# Patient Record
Sex: FEMALE | Race: OTHER | Employment: UNEMPLOYED | URBAN - METROPOLITAN AREA
[De-identification: names, ages, dates, MRNs, and addresses within clinical notes are randomized per-mention and may not be internally consistent; named-entity substitution may affect disease eponyms.]

---

## 2017-04-30 ENCOUNTER — APPOINTMENT (OUTPATIENT)
Dept: CT IMAGING | Age: 32
End: 2017-04-30
Attending: EMERGENCY MEDICINE
Payer: SELF-PAY

## 2017-04-30 ENCOUNTER — HOSPITAL ENCOUNTER (EMERGENCY)
Age: 32
Discharge: HOME | End: 2017-04-30
Attending: EMERGENCY MEDICINE | Admitting: EMERGENCY MEDICINE
Payer: SELF-PAY

## 2017-04-30 VITALS
DIASTOLIC BLOOD PRESSURE: 76 MMHG | TEMPERATURE: 98.6 F | RESPIRATION RATE: 18 BRPM | HEART RATE: 75 BPM | BODY MASS INDEX: 41.65 KG/M2 | OXYGEN SATURATION: 99 % | HEIGHT: 65 IN | SYSTOLIC BLOOD PRESSURE: 139 MMHG | WEIGHT: 250 LBS

## 2017-04-30 DIAGNOSIS — R10.31 ABDOMINAL PAIN, RIGHT LOWER QUADRANT: Primary | ICD-10-CM

## 2017-04-30 LAB
ALBUMIN SERPL BCP-MCNC: 3.6 G/DL (ref 3.5–5)
ALBUMIN/GLOB SERPL: 0.9 {RATIO} (ref 1.1–2.2)
ALP SERPL-CCNC: 76 U/L (ref 45–117)
ALT SERPL-CCNC: 48 U/L (ref 12–78)
ANION GAP BLD CALC-SCNC: 7 MMOL/L (ref 5–15)
APPEARANCE UR: CLEAR
AST SERPL W P-5'-P-CCNC: 23 U/L (ref 15–37)
BACTERIA URNS QL MICRO: NEGATIVE /HPF
BASOPHILS # BLD AUTO: 0 K/UL (ref 0–0.1)
BASOPHILS # BLD: 0 % (ref 0–1)
BILIRUB SERPL-MCNC: 0.3 MG/DL (ref 0.2–1)
BILIRUB UR QL: NEGATIVE
BUN SERPL-MCNC: 11 MG/DL (ref 6–20)
BUN/CREAT SERPL: 18 (ref 12–20)
CALCIUM SERPL-MCNC: 8.5 MG/DL (ref 8.5–10.1)
CHLORIDE SERPL-SCNC: 106 MMOL/L (ref 97–108)
CO2 SERPL-SCNC: 25 MMOL/L (ref 21–32)
COLOR UR: ABNORMAL
CREAT SERPL-MCNC: 0.61 MG/DL (ref 0.55–1.02)
EOSINOPHIL # BLD: 0.4 K/UL (ref 0–0.4)
EOSINOPHIL NFR BLD: 5 % (ref 0–7)
EPITH CASTS URNS QL MICRO: ABNORMAL /LPF
ERYTHROCYTE [DISTWIDTH] IN BLOOD BY AUTOMATED COUNT: 13.9 % (ref 11.5–14.5)
GLOBULIN SER CALC-MCNC: 3.9 G/DL (ref 2–4)
GLUCOSE SERPL-MCNC: 101 MG/DL (ref 65–100)
GLUCOSE UR STRIP.AUTO-MCNC: NEGATIVE MG/DL
HCG UR QL: NEGATIVE
HCT VFR BLD AUTO: 39.2 % (ref 35–47)
HGB BLD-MCNC: 12.5 G/DL (ref 11.5–16)
HGB UR QL STRIP: ABNORMAL
KETONES UR QL STRIP.AUTO: NEGATIVE MG/DL
LEUKOCYTE ESTERASE UR QL STRIP.AUTO: NEGATIVE
LYMPHOCYTES # BLD AUTO: 27 % (ref 12–49)
LYMPHOCYTES # BLD: 2.4 K/UL (ref 0.8–3.5)
MCH RBC QN AUTO: 25.9 PG (ref 26–34)
MCHC RBC AUTO-ENTMCNC: 31.9 G/DL (ref 30–36.5)
MCV RBC AUTO: 81.2 FL (ref 80–99)
MONOCYTES # BLD: 0.6 K/UL (ref 0–1)
MONOCYTES NFR BLD AUTO: 7 % (ref 5–13)
NEUTS SEG # BLD: 5.3 K/UL (ref 1.8–8)
NEUTS SEG NFR BLD AUTO: 61 % (ref 32–75)
NITRITE UR QL STRIP.AUTO: NEGATIVE
PH UR STRIP: 6 [PH] (ref 5–8)
PLATELET # BLD AUTO: 358 K/UL (ref 150–400)
POTASSIUM SERPL-SCNC: 3.7 MMOL/L (ref 3.5–5.1)
PROT SERPL-MCNC: 7.5 G/DL (ref 6.4–8.2)
PROT UR STRIP-MCNC: NEGATIVE MG/DL
RBC # BLD AUTO: 4.83 M/UL (ref 3.8–5.2)
RBC #/AREA URNS HPF: ABNORMAL /HPF (ref 0–5)
SODIUM SERPL-SCNC: 138 MMOL/L (ref 136–145)
SP GR UR REFRACTOMETRY: 1.01 (ref 1–1.03)
UROBILINOGEN UR QL STRIP.AUTO: 0.2 EU/DL (ref 0.2–1)
WBC # BLD AUTO: 8.7 K/UL (ref 3.6–11)
WBC URNS QL MICRO: ABNORMAL /HPF (ref 0–4)

## 2017-04-30 PROCEDURE — 36415 COLL VENOUS BLD VENIPUNCTURE: CPT | Performed by: EMERGENCY MEDICINE

## 2017-04-30 PROCEDURE — 80053 COMPREHEN METABOLIC PANEL: CPT | Performed by: EMERGENCY MEDICINE

## 2017-04-30 PROCEDURE — 74177 CT ABD & PELVIS W/CONTRAST: CPT

## 2017-04-30 PROCEDURE — 81025 URINE PREGNANCY TEST: CPT

## 2017-04-30 PROCEDURE — 96361 HYDRATE IV INFUSION ADD-ON: CPT

## 2017-04-30 PROCEDURE — 74011000258 HC RX REV CODE- 258: Performed by: EMERGENCY MEDICINE

## 2017-04-30 PROCEDURE — 81001 URINALYSIS AUTO W/SCOPE: CPT | Performed by: EMERGENCY MEDICINE

## 2017-04-30 PROCEDURE — 74011250636 HC RX REV CODE- 250/636: Performed by: EMERGENCY MEDICINE

## 2017-04-30 PROCEDURE — 85025 COMPLETE CBC W/AUTO DIFF WBC: CPT | Performed by: EMERGENCY MEDICINE

## 2017-04-30 PROCEDURE — 99284 EMERGENCY DEPT VISIT MOD MDM: CPT

## 2017-04-30 PROCEDURE — 96374 THER/PROPH/DIAG INJ IV PUSH: CPT

## 2017-04-30 PROCEDURE — 74011636320 HC RX REV CODE- 636/320: Performed by: EMERGENCY MEDICINE

## 2017-04-30 PROCEDURE — 96375 TX/PRO/DX INJ NEW DRUG ADDON: CPT

## 2017-04-30 RX ORDER — SODIUM CHLORIDE 0.9 % (FLUSH) 0.9 %
10 SYRINGE (ML) INJECTION
Status: COMPLETED | OUTPATIENT
Start: 2017-04-30 | End: 2017-04-30

## 2017-04-30 RX ORDER — ONDANSETRON 2 MG/ML
4 INJECTION INTRAMUSCULAR; INTRAVENOUS
Status: COMPLETED | OUTPATIENT
Start: 2017-04-30 | End: 2017-04-30

## 2017-04-30 RX ORDER — MORPHINE SULFATE 4 MG/ML
4 INJECTION, SOLUTION INTRAMUSCULAR; INTRAVENOUS ONCE
Status: COMPLETED | OUTPATIENT
Start: 2017-04-30 | End: 2017-04-30

## 2017-04-30 RX ORDER — HYDROCODONE BITARTRATE AND ACETAMINOPHEN 7.5; 325 MG/1; MG/1
1 TABLET ORAL
Qty: 20 TAB | Refills: 0 | Status: SHIPPED | OUTPATIENT
Start: 2017-04-30 | End: 2017-09-11

## 2017-04-30 RX ADMIN — SODIUM CHLORIDE 1000 ML: 900 INJECTION, SOLUTION INTRAVENOUS at 09:35

## 2017-04-30 RX ADMIN — Medication 10 ML: at 10:21

## 2017-04-30 RX ADMIN — SODIUM CHLORIDE 100 ML: 900 INJECTION, SOLUTION INTRAVENOUS at 10:21

## 2017-04-30 RX ADMIN — IOPAMIDOL 100 ML: 755 INJECTION, SOLUTION INTRAVENOUS at 10:21

## 2017-04-30 RX ADMIN — ONDANSETRON 4 MG: 2 INJECTION INTRAMUSCULAR; INTRAVENOUS at 09:34

## 2017-04-30 RX ADMIN — Medication 4 MG: at 09:34

## 2017-04-30 NOTE — ED NOTES
Pt resting comfortably with eyes closed @ this time. Respirations even and unlabored. Easily awakens to voice and light touch. No distress noted. Call light in reach. Will continue to monitor.

## 2017-04-30 NOTE — DISCHARGE INSTRUCTIONS
Dolor abdominal: Instrucciones de cuidado - [ Abdominal Pain: Care Instructions ]  Instrucciones de cuidado    El dolor abdominal tiene muchas causas posibles. Algunas de ellas no son graves y mejoran por sí solas en unos días. Otras requieren Linda Emmett y [place]. Si reyna dolor continúa o [place], necesitará konstantin nueva revisión y [place] pruebas para determinar qué pasa. Es posible que necesite cirugía para corregir el problema. No ignore nuevos síntomas, wilner fiebre, náuseas y [place], [facility], dolor que [place] o [place]. Podrían ser señales de un problema más grave. Reyna médico puede haberle recomendado konstantin consulta de [employer] las 8 o 12 horas siguientes. Si no se siente mejor, es posible que requiera Linda Emmett o [place]. El médico lo blackmon revisado minuciosamente, barry puede josse problemas más tarde. Si nota algún problema o síntomas nuevos, busque tratamiento médico inmediatamente. La atención de seguimiento es konstantin parte clave de reyna tratamiento y seguridad. Asegúrese de hacer y acudir a todas las citas, y llame a reyna médico si está teniendo problemas. También es konstantin buena idea saber los resultados de los exámenes y mantener konstantin lista de los medicamentos que lacho. ¿Cómo puede cuidarse en el hogar? · Descanse hasta que se sienta mejor. · Para prevenir la deshidratación, dena abundantes líquidos, suficientes para que reyna orina sea de color amarillo norma o transparente wilner el agua. Elija beber agua y otros líquidos mojgan sin cafeína hasta que se sienta mejor. Si tiene [employer], del corazón o del hígado y tiene que Angela's líquidos, hable con reyna médico antes de aumentar reyna consumo. · Si tiene [employer], coma alimentos suaves, wilner arroz, pan dave seco o galletas saladas, bananas (plátanos) y puré de [employer]. Trate de comer varias comidas pequeñas al día en lugar de dos o lea grandes.   · Espere hasta 50 horas después de que [employer] síntomas hayan desaparecido antes de comer alimentos condimentados, alcohol y bebidas que contengan cafeína. · No consuma alimentos ricos en grasa. · Evite medicamentos antiinflamatorios wilner aspirina, ibuprofeno (Advil, Motrin) y naproxeno (Aleve). Pueden causar [employer]. Dígale a richter médico si está tomando aspirina diariamente debido a otro problema de velvet. ¿Cuándo debe pedir ayuda? Llame al 911 en cualquier momento que considere que necesita atención de emergencia. Por ejemplo, llame si:  · Se desmayó (perdió el conocimiento). · Las heces son de color rojizo o muy sanguinolentas (con katherine). · Vomita katherine o algo parecido a granos de café molido. · Tiene dolor abdominal nuevo e intenso. Llame a richter médico ahora mismo o busque atención médica inmediata si:  · Richter dolor empeora, sobre todo si se concentra en konstantin berhane parte del vientre. · Vuelve a tener fiebre o tiene fiebre más gus. · Gala heces son negruzcas y parecidas al alquitrán o tienen rastros de [place]. · Tiene sangrado vaginal inesperado. · Tiene síntomas de konstantin infección del tracto urinario. Estos podrían incluir:  ¨ Dolor al [place]. ¨ Orinar con más frecuencia que lo habitual.  ¨ Katherine en la [place]. · Siente mareos o aturdimiento, o que está a punto de [place]. Preste especial atención a los cambios en richter velvet y asegúrese de comunicarse con richter médico si:  · No está mejorando después de 1 día (24 horas). ¿Dónde puede encontrar más información en inglés? Hiwot Hayes a http://césar-clovis.info/. Mathew Miller W347 en la búsqueda para aprender más acerca de \"Dolor abdominal: Instrucciones de cuidado - [ Abdominal Pain: Care Instructions ]. \"  Revisado: 27 sellers, 2016  Versión del contenido: 11.2  © 2799-2121 [employer]. Las instrucciones de cuidado fueron adaptadas bajo licencia por [employer] (which disclaims liability or warranty for this information).  Si usted tiene [place] afección médica o sobre estas instrucciones, siempre pregunte a reyna profesional de velvet. [employer] niega toda garantía o responsabilidad por reyna uso de esta información. We hope that we have addressed all of your medical concerns. The examination and treatment you received in the Emergency Department were for an emergent problem and were not intended as complete care. It is important that you follow up with your healthcare provider(s) for ongoing care. If your symptoms worsen or do not improve as expected, and you are unable to reach your usual health care provider(s), you should return to the Emergency Department. Today's healthcare is undergoing tremendous change, and patient satisfaction surveys are one of the many tools to assess the quality of medical care. You may receive a survey from the [employer] regarding your experience in the Emergency Department. I hope that your experience has been completely positive, particularly the medical care that I provided. As such, please participate in the survey; anything less than excellent does not meet my expectations or intentions. [facility] and [address] [place] [address] participate in nationally recognized quality of care measures. If your blood pressure is greater than 120/80, as reported below, we urge that you seek medical care to address the potential of high blood pressure, commonly known as hypertension. Hypertension can be hereditary or can be caused by certain medical conditions, pain, stress, or \"white coat syndrome. \"       Please make an appointment with your health care provider(s) for follow up of your Emergency Department visit.        VITALS:   Patient Vitals for the past 8 hrs:   Temp Pulse Resp BP SpO2   04/30/17 1000 - - - 141/64 98 %   04/30/17 0945 - - - 131/68 97 %   04/30/17 0930 - - - 128/81 97 %   04/30/17 0915 - - - 110/56 97 %   04/30/17 0900 - - - 127/67 99 % 04/30/17 0853 98.3 °F (36.8 °C) 76 18 (!) 142/92 98 %          Thank you for allowing us to provide you with medical care today. We realize that you have many choices for your emergency care needs. Please choose us in the future for any continued health care needs. Maria Luz Bright MD    [address] [facility].   Office: [phone]            Recent Results (from the past 24 hour(s))   CBC WITH AUTOMATED DIFF    Collection Time: 04/30/17  8:57 AM   Result Value Ref Range    WBC 8.7 3.6 - 11.0 K/uL    RBC 4.83 3.80 - 5.20 M/uL    HGB 12.5 11.5 - 16.0 g/dL    HCT 39.2 35.0 - 47.0 %    MCV 81.2 80.0 - 99.0 FL    MCH 25.9 (L) 26.0 - 34.0 PG    MCHC 31.9 30.0 - 36.5 g/dL    RDW 13.9 11.5 - 14.5 %    PLATELET 812 734 - 788 K/uL    NEUTROPHILS 61 32 - 75 %    LYMPHOCYTES 27 12 - 49 %    MONOCYTES 7 5 - 13 %    EOSINOPHILS 5 0 - 7 %    BASOPHILS 0 0 - 1 %    ABS. NEUTROPHILS 5.3 1.8 - 8.0 K/UL    ABS. LYMPHOCYTES 2.4 0.8 - 3.5 K/UL    ABS. MONOCYTES 0.6 0.0 - 1.0 K/UL    ABS. EOSINOPHILS 0.4 0.0 - 0.4 K/UL    ABS. BASOPHILS 0.0 0.0 - 0.1 K/UL   METABOLIC PANEL, COMPREHENSIVE    Collection Time: 04/30/17  8:57 AM   Result Value Ref Range    Sodium 138 136 - 145 mmol/L    Potassium 3.7 3.5 - 5.1 mmol/L    Chloride 106 97 - 108 mmol/L    CO2 25 21 - 32 mmol/L    Anion gap 7 5 - 15 mmol/L    Glucose 101 (H) 65 - 100 mg/dL    BUN 11 6 - 20 MG/DL    Creatinine 0.61 0.55 - 1.02 MG/DL    BUN/Creatinine ratio 18 12 - 20      GFR est AA >60 >60 ml/min/1.73m2    GFR est non-AA >60 >60 ml/min/1.73m2    Calcium 8.5 8.5 - 10.1 MG/DL    Bilirubin, total 0.3 0.2 - 1.0 MG/DL    ALT (SGPT) 48 12 - 78 U/L    AST (SGOT) 23 15 - 37 U/L    Alk.  phosphatase 76 45 - 117 U/L    Protein, total 7.5 6.4 - 8.2 g/dL    Albumin 3.6 3.5 - 5.0 g/dL    Globulin 3.9 2.0 - 4.0 g/dL    A-G Ratio 0.9 (L) 1.1 - 2.2     URINALYSIS W/MICROSCOPIC    Collection Time: 04/30/17  9:08 AM   Result Value Ref Range    Color YELLOW/STRAW      Appearance CLEAR CLEAR      Specific gravity 1.015 1.003 - 1.030      pH (UA) 6.0 5.0 - 8.0      Protein NEGATIVE  NEG mg/dL    Glucose NEGATIVE  NEG mg/dL    Ketone NEGATIVE  NEG mg/dL    Bilirubin NEGATIVE  NEG      Blood MODERATE (A) NEG      Urobilinogen 0.2 0.2 - 1.0 EU/dL    Nitrites NEGATIVE  NEG      Leukocyte Esterase NEGATIVE  NEG      WBC 0-4 0 - 4 /hpf    RBC 0-5 0 - 5 /hpf    Epithelial cells FEW FEW /lpf    Bacteria NEGATIVE  NEG /hpf   HCG URINE, QL. - POC    Collection Time: 04/30/17 10:01 AM   Result Value Ref Range    Pregnancy test,urine (POC) NEGATIVE  NEG         Ct Abd Pelv W Cont    Result Date: 4/30/2017  INDICATION: RLQ pain/torsed R fallopian tube resection /still has appendix and ovary I. Right lower quadrant abdominal pain that began at 0400 hours this morning, described as stabbing and constant. Currently on menstrual cycle. Reports similar abdominal pain with past menstrual cycles although pain today is worse than in past. COMPARISON: CT February 23, 2016 TECHNIQUE: Following the uneventful intravenous administration of 100 cc Isovue-370, thin axial images were obtained through the abdomen and pelvis. Coronal and sagittal reconstructions were generated. Oral contrast was not, per order of the ordering physician, administered. CT dose reduction was achieved through use of a standardized protocol tailored for this examination and automatic exposure control for dose modulation. Adaptive statistical iterative reconstruction (ASIR) was utilized. The lack of oral contrast material substantially decreases the capacity of CT to determine abnormalities affecting the bowel. FINDINGS: LUNG BASES: Clear. INCIDENTALLY IMAGED HEART AND MEDIASTINUM: Unremarkable. LIVER: Mild diffuse steatosis. No mass. GALLBLADDER: Status post cholecystectomy. No biliary ductal dilation. SPLEEN: No mass. PANCREAS: No mass or ductal dilatation. ADRENALS: Unremarkable.  KIDNEYS: No mass, calculus, or hydronephrosis. STOMACH: Nondistended. SMALL BOWEL: Nondistended. COLON: Nondistended. APPENDIX: Normal caliber. PERITONEUM: No ascites or pneumoperitoneum. RETROPERITONEUM: No lymphadenopathy or aortic aneurysm. REPRODUCTIVE ORGANS: Vaginal tampon present. No mass, cyst or collection demonstrated. Previously noted 7.6 cm pelvic cystic lesion no longer shown. URINARY BLADDER: No mass or calculus. BONES: Normal. ADDITIONAL COMMENTS: N/A     IMPRESSION: No acute findings.

## 2017-04-30 NOTE — ED NOTES
Pt and family @ bedside updated on care. All verbalized understanding. Pt resting comfortably. Pt reports pain is 3/10 @ this time. V/S stable, no distress noted. Will cont to assess and monitor closely.

## 2017-04-30 NOTE — ED TRIAGE NOTES
Pt reports right lower abd pain that began @ 0400 this morning that she describes as stabbing and constant. Pt denies N/V/D or dysuria.

## 2017-04-30 NOTE — ED PROVIDER NOTES
HPI Comments: 28 y.o. female with no significant past medical history who presents to the ED with chief complaint of abdominal pain. Patient reports RLQ abdominal pain with onset at ~0400 \"this morning. \" Patient reports an episode of vomiting with onset of her symptoms. Patient reports currently being on her menstrual cycle. Patient reports a history of similar abdominal pain with past menstrual cycles; reports her pain today is worse than her past symptoms. Patient denies vomiting with her past menstrual cycle pain. Patient reports having \"two\" children. Patient reports a history of an appendectomy, cholecystomy, and an oophorectomy. There are no other acute medical concerns at this time. PCP: None    Note written by Shireen Welch, as dictated by Miguel Angel Mccall MD 9:20 AM     The history is provided by the patient. The history is limited by a language barrier. A  was used. History reviewed. No pertinent past medical history. Past Surgical History:   Procedure Laterality Date    HX LAP CHOLECYSTECTOMY           History reviewed. No pertinent family history. Social History     Social History    Marital status: SINGLE     Spouse name: N/A    Number of children: N/A    Years of education: N/A     Occupational History    Not on file. Social History Main Topics    Smoking status: Never Smoker    Smokeless tobacco: Not on file    Alcohol use No    Drug use: No    Sexual activity: Not on file     Other Topics Concern    Not on file     Social History Narrative    ** Merged History Encounter **         ** Merged History Encounter **              ALLERGIES: Dilaudid [hydromorphone (bulk)]; Other medication; and Vancomycin    Review of Systems   Constitutional: Negative for appetite change, chills and fever. HENT: Negative for rhinorrhea, sore throat and trouble swallowing. Eyes: Negative for photophobia.    Respiratory: Negative for cough and shortness of breath. Cardiovascular: Negative for chest pain and palpitations. Gastrointestinal: Positive for abdominal pain and vomiting. Negative for nausea. Genitourinary: Negative for dysuria, frequency and hematuria. Musculoskeletal: Negative for arthralgias. Neurological: Negative for dizziness, syncope and weakness. Psychiatric/Behavioral: Negative for behavioral problems. The patient is not nervous/anxious. All other systems reviewed and are negative. Vitals:    04/30/17 0853 04/30/17 0900 04/30/17 0915   BP: (!) 142/92 127/67 110/56   Pulse: 76     Resp: 18     Temp: 98.3 °F (36.8 °C)     SpO2: 98% 99% 97%   Weight: 113.4 kg (250 lb)     Height: 5' 5\" (1.651 m)              Physical Exam   Constitutional: She appears well-developed and well-nourished. HENT:   Head: Normocephalic and atraumatic. Mouth/Throat: Oropharynx is clear and moist.   Eyes: EOM are normal. Pupils are equal, round, and reactive to light. Neck: Normal range of motion. Neck supple. Cardiovascular: Normal rate, regular rhythm, normal heart sounds and intact distal pulses. Exam reveals no gallop and no friction rub. No murmur heard. Pulmonary/Chest: Effort normal. No respiratory distress. She has no wheezes. She has no rales. Abdominal: Soft. There is tenderness. There is no rebound. Mild RLQ abdominal tenderness; multiple well-healed laparoscopy scars. Musculoskeletal: Normal range of motion. She exhibits no tenderness. Neurological: She is alert. No cranial nerve deficit. Motor; symmetric   Skin: No erythema. Psychiatric: She has a normal mood and affect. Her behavior is normal.   Nursing note and vitals reviewed. Note written by Shireen Melendrez, as dictated by Yusuf Portillo MD 9:24 AM      [facility]  ED Course       Procedures    Note: Patient feels like she's had an appendectomy. She had a torsed right fallopian tube February 2016 which was removed by GYN surgery.  The appendix was adherent to this mass. I do not believe the appendix was removed. Appendectomy is not mentioned in her problem list and is not mentioned after it was  from the torsed fallopian tube in the operative note so I assume it was not removed. Patient is having right lower quadrant pain tenderness and vomiting. This is her typical pain with menses but is more severe and associated with vomiting compared to usual. The plan is to do another CAT scan to try to up figure out if  she has appendicitis this time or perhaps a torsed ovary. I do not believe the right ovary was removed at the last surgery after reviewing the operative note.   Nan Boothe MD  9:33 AM

## 2017-09-11 ENCOUNTER — APPOINTMENT (OUTPATIENT)
Dept: CT IMAGING | Age: 32
End: 2017-09-11
Attending: PHYSICIAN ASSISTANT
Payer: SELF-PAY

## 2017-09-11 ENCOUNTER — HOSPITAL ENCOUNTER (EMERGENCY)
Age: 32
Discharge: HOME | End: 2017-09-12
Attending: EMERGENCY MEDICINE
Payer: SELF-PAY

## 2017-09-11 DIAGNOSIS — R19.7 DIARRHEA, UNSPECIFIED TYPE: ICD-10-CM

## 2017-09-11 DIAGNOSIS — R10.84 ABDOMINAL PAIN, GENERALIZED: ICD-10-CM

## 2017-09-11 DIAGNOSIS — R51.9 NONINTRACTABLE HEADACHE, UNSPECIFIED CHRONICITY PATTERN, UNSPECIFIED HEADACHE TYPE: Primary | ICD-10-CM

## 2017-09-11 LAB
ALBUMIN SERPL-MCNC: 3.2 G/DL (ref 3.5–5)
ALBUMIN/GLOB SERPL: 0.8 {RATIO} (ref 1.1–2.2)
ALP SERPL-CCNC: 72 U/L (ref 45–117)
ALT SERPL-CCNC: 133 U/L (ref 12–78)
ANION GAP SERPL CALC-SCNC: 11 MMOL/L (ref 5–15)
APPEARANCE UR: CLEAR
AST SERPL-CCNC: 79 U/L (ref 15–37)
BACTERIA URNS QL MICRO: NEGATIVE /HPF
BASOPHILS # BLD: 0 K/UL (ref 0–0.1)
BASOPHILS NFR BLD: 1 % (ref 0–1)
BILIRUB SERPL-MCNC: 0.2 MG/DL (ref 0.2–1)
BILIRUB UR QL: NEGATIVE
BUN SERPL-MCNC: 12 MG/DL (ref 6–20)
BUN/CREAT SERPL: 18 (ref 12–20)
CALCIUM SERPL-MCNC: 9.4 MG/DL (ref 8.5–10.1)
CAOX CRY URNS QL MICRO: ABNORMAL
CHLORIDE SERPL-SCNC: 105 MMOL/L (ref 97–108)
CO2 SERPL-SCNC: 24 MMOL/L (ref 21–32)
COLOR UR: ABNORMAL
CREAT SERPL-MCNC: 0.66 MG/DL (ref 0.55–1.02)
EOSINOPHIL # BLD: 0.5 K/UL (ref 0–0.4)
EOSINOPHIL NFR BLD: 7 % (ref 0–7)
EPITH CASTS URNS QL MICRO: ABNORMAL /LPF
ERYTHROCYTE [DISTWIDTH] IN BLOOD BY AUTOMATED COUNT: 13.3 % (ref 11.5–14.5)
GLOBULIN SER CALC-MCNC: 4.1 G/DL (ref 2–4)
GLUCOSE SERPL-MCNC: 106 MG/DL (ref 65–100)
GLUCOSE UR STRIP.AUTO-MCNC: NEGATIVE MG/DL
HCG UR QL: NEGATIVE
HCT VFR BLD AUTO: 38.3 % (ref 35–47)
HGB BLD-MCNC: 12.4 G/DL (ref 11.5–16)
HGB UR QL STRIP: NEGATIVE
KETONES UR QL STRIP.AUTO: NEGATIVE MG/DL
LEUKOCYTE ESTERASE UR QL STRIP.AUTO: NEGATIVE
LIPASE SERPL-CCNC: 236 U/L (ref 73–393)
LYMPHOCYTES # BLD: 2.7 K/UL (ref 0.8–3.5)
LYMPHOCYTES NFR BLD: 36 % (ref 12–49)
MCH RBC QN AUTO: 26.9 PG (ref 26–34)
MCHC RBC AUTO-ENTMCNC: 32.4 G/DL (ref 30–36.5)
MCV RBC AUTO: 83.1 FL (ref 80–99)
MONOCYTES # BLD: 0.7 K/UL (ref 0–1)
MONOCYTES NFR BLD: 9 % (ref 5–13)
NEUTS SEG # BLD: 3.6 K/UL (ref 1.8–8)
NEUTS SEG NFR BLD: 47 % (ref 32–75)
NITRITE UR QL STRIP.AUTO: NEGATIVE
PH UR STRIP: 6 [PH] (ref 5–8)
PLATELET # BLD AUTO: 336 K/UL (ref 150–400)
POTASSIUM SERPL-SCNC: 3.6 MMOL/L (ref 3.5–5.1)
PROT SERPL-MCNC: 7.3 G/DL (ref 6.4–8.2)
PROT UR STRIP-MCNC: NEGATIVE MG/DL
RBC # BLD AUTO: 4.61 M/UL (ref 3.8–5.2)
RBC #/AREA URNS HPF: ABNORMAL /HPF (ref 0–5)
SODIUM SERPL-SCNC: 140 MMOL/L (ref 136–145)
SP GR UR REFRACTOMETRY: 1.03 (ref 1–1.03)
UR CULT HOLD, URHOLD: NORMAL
UROBILINOGEN UR QL STRIP.AUTO: 0.2 EU/DL (ref 0.2–1)
WBC # BLD AUTO: 7.5 K/UL (ref 3.6–11)
WBC URNS QL MICRO: ABNORMAL /HPF (ref 0–4)

## 2017-09-11 PROCEDURE — 36415 COLL VENOUS BLD VENIPUNCTURE: CPT | Performed by: EMERGENCY MEDICINE

## 2017-09-11 PROCEDURE — 83690 ASSAY OF LIPASE: CPT | Performed by: EMERGENCY MEDICINE

## 2017-09-11 PROCEDURE — 96374 THER/PROPH/DIAG INJ IV PUSH: CPT

## 2017-09-11 PROCEDURE — 96361 HYDRATE IV INFUSION ADD-ON: CPT

## 2017-09-11 PROCEDURE — 81001 URINALYSIS AUTO W/SCOPE: CPT | Performed by: PHYSICIAN ASSISTANT

## 2017-09-11 PROCEDURE — 74011000258 HC RX REV CODE- 258: Performed by: EMERGENCY MEDICINE

## 2017-09-11 PROCEDURE — 74011636320 HC RX REV CODE- 636/320: Performed by: EMERGENCY MEDICINE

## 2017-09-11 PROCEDURE — 85025 COMPLETE CBC W/AUTO DIFF WBC: CPT | Performed by: EMERGENCY MEDICINE

## 2017-09-11 PROCEDURE — 74011250636 HC RX REV CODE- 250/636: Performed by: PHYSICIAN ASSISTANT

## 2017-09-11 PROCEDURE — 81025 URINE PREGNANCY TEST: CPT

## 2017-09-11 PROCEDURE — 80053 COMPREHEN METABOLIC PANEL: CPT | Performed by: EMERGENCY MEDICINE

## 2017-09-11 PROCEDURE — 96375 TX/PRO/DX INJ NEW DRUG ADDON: CPT

## 2017-09-11 PROCEDURE — 74177 CT ABD & PELVIS W/CONTRAST: CPT

## 2017-09-11 PROCEDURE — 99283 EMERGENCY DEPT VISIT LOW MDM: CPT

## 2017-09-11 RX ORDER — SODIUM CHLORIDE 0.9 % (FLUSH) 0.9 %
10 SYRINGE (ML) INJECTION
Status: COMPLETED | OUTPATIENT
Start: 2017-09-11 | End: 2017-09-11

## 2017-09-11 RX ORDER — KETOROLAC TROMETHAMINE 30 MG/ML
30 INJECTION, SOLUTION INTRAMUSCULAR; INTRAVENOUS
Status: COMPLETED | OUTPATIENT
Start: 2017-09-11 | End: 2017-09-11

## 2017-09-11 RX ORDER — ONDANSETRON 2 MG/ML
4 INJECTION INTRAMUSCULAR; INTRAVENOUS
Status: COMPLETED | OUTPATIENT
Start: 2017-09-11 | End: 2017-09-11

## 2017-09-11 RX ADMIN — SODIUM CHLORIDE 1000 ML: 900 INJECTION, SOLUTION INTRAVENOUS at 22:47

## 2017-09-11 RX ADMIN — ONDANSETRON 4 MG: 2 INJECTION INTRAMUSCULAR; INTRAVENOUS at 23:11

## 2017-09-11 RX ADMIN — SODIUM CHLORIDE 100 ML: 900 INJECTION, SOLUTION INTRAVENOUS at 23:45

## 2017-09-11 RX ADMIN — Medication 10 ML: at 23:45

## 2017-09-11 RX ADMIN — IOPAMIDOL 100 ML: 755 INJECTION, SOLUTION INTRAVENOUS at 23:45

## 2017-09-11 RX ADMIN — KETOROLAC TROMETHAMINE 30 MG: 30 INJECTION, SOLUTION INTRAMUSCULAR at 23:11

## 2017-09-12 VITALS
DIASTOLIC BLOOD PRESSURE: 75 MMHG | WEIGHT: 268.13 LBS | OXYGEN SATURATION: 100 % | BODY MASS INDEX: 44.67 KG/M2 | HEART RATE: 78 BPM | RESPIRATION RATE: 20 BRPM | TEMPERATURE: 98.2 F | HEIGHT: 65 IN | SYSTOLIC BLOOD PRESSURE: 123 MMHG

## 2017-09-12 PROCEDURE — 74011250637 HC RX REV CODE- 250/637: Performed by: PHYSICIAN ASSISTANT

## 2017-09-12 RX ORDER — DICYCLOMINE HYDROCHLORIDE 20 MG/1
20 TABLET ORAL EVERY 6 HOURS
Qty: 20 TAB | Refills: 0 | Status: SHIPPED | OUTPATIENT
Start: 2017-09-12 | End: 2017-09-17

## 2017-09-12 RX ORDER — DICYCLOMINE HYDROCHLORIDE 20 MG/1
20 TABLET ORAL
Status: COMPLETED | OUTPATIENT
Start: 2017-09-12 | End: 2017-09-12

## 2017-09-12 RX ORDER — OMEPRAZOLE 20 MG/1
20 CAPSULE, DELAYED RELEASE ORAL DAILY
Qty: 20 CAP | Refills: 0 | Status: SHIPPED | OUTPATIENT
Start: 2017-09-12

## 2017-09-12 RX ORDER — DICYCLOMINE HYDROCHLORIDE 20 MG/1
20 TABLET ORAL EVERY 6 HOURS
Qty: 20 TAB | Refills: 0 | Status: SHIPPED | OUTPATIENT
Start: 2017-09-12 | End: 2017-09-12

## 2017-09-12 RX ORDER — ONDANSETRON 4 MG/1
4 TABLET, ORALLY DISINTEGRATING ORAL
Qty: 12 TAB | Refills: 0 | Status: SHIPPED | OUTPATIENT
Start: 2017-09-12 | End: 2017-09-22

## 2017-09-12 RX ORDER — KETOROLAC TROMETHAMINE 30 MG/ML
30 INJECTION, SOLUTION INTRAMUSCULAR; INTRAVENOUS
Status: DISCONTINUED | OUTPATIENT
Start: 2017-09-12 | End: 2017-09-12

## 2017-09-12 RX ADMIN — DICYCLOMINE HYDROCHLORIDE 20 MG: 20 TABLET ORAL at 00:43

## 2017-09-12 NOTE — ED TRIAGE NOTES
Wed. I started with diarrhea and having pain in the middle of my stomach. Tonight at 1700 I started with a H/A. The H/A is why I came in tonight. No vomiting.

## 2017-09-12 NOTE — DISCHARGE INSTRUCTIONS
Dolor abdominal: Instrucciones de cuidado - [ Abdominal Pain: Care Instructions ]  Instrucciones de cuidado    El dolor abdominal tiene muchas causas posibles. Algunas de ellas no son graves y mejoran por sí solas en unos días. Otras requieren [place] y [place]. Si reyna dolor continúa o [place], necesitará konstantin nueva revisión y [place] pruebas para determinar qué pasa. Es posible que necesite cirugía para corregir el problema. No ignore nuevos síntomas, wilner fiebre, náuseas y [place], [facility], dolor que [place] o [place]. Podrían ser señales de un problema más grave. Reyna médico puede haberle recomendado konstantin consulta de [employer] las 8 o 12 horas siguientes. Si no se siente mejor, es posible que requiera [place] o [place]. El médico lo blackmon revisado minuciosamente, barry puede josse problemas más tarde. Si nota algún problema o síntomas nuevos, busque tratamiento médico inmediatamente. La atención de seguimiento es konstantin parte clave de reyna tratamiento y seguridad. Asegúrese de hacer y acudir a todas las citas, y llame a reyna médico si está teniendo problemas. También es konstantin buena idea saber los resultados de los exámenes y mantener konstantin lista de los medicamentos que lacho. ¿Cómo puede cuidarse en el hogar? · Descanse hasta que se sienta mejor. · Para prevenir la deshidratación, dena abundantes líquidos, suficientes para que renya orina sea de color amarillo norma o transparente wilner el agua. Elija beber agua y otros líquidos mojgan sin cafeína hasta que se sienta mejor. Si tiene [employer], del corazón o del hígado y tiene que Angela's líquidos, hable con reyna médico antes de aumentar reyna consumo. · Si tiene [employer], coma alimentos suaves, wilner arroz, pan dave seco o galletas saladas, bananas (plátanos) y puré de [employer]. Trate de comer varias comidas pequeñas al día en lugar de dos o lea grandes.   · Espere hasta 50 horas después de que [employer] síntomas hayan desaparecido antes de comer alimentos condimentados, alcohol y bebidas que contengan cafeína. · No consuma alimentos ricos en grasa. · Evite medicamentos antiinflamatorios wilner aspirina, ibuprofeno (Advil, Motrin) y naproxeno (Aleve). Pueden causar [employer]. Dígale a reyna médico si está tomando aspirina diariamente debido a otro problema de velvet. ¿Cuándo debe pedir ayuda? Llame al 911 en cualquier momento que considere que necesita atención de emergencia. Por ejemplo, llame si:  · Se desmayó (perdió el conocimiento). · Las heces son de color rojizo o muy sanguinolentas (con katherine). · Vomita katherine o algo parecido a granos de café molido. · Tiene dolor abdominal nuevo e intenso. Llame a reyna médico ahora mismo o busque atención médica inmediata si:  · Reyna dolor empeora, sobre todo si se concentra en konstantin berhane parte del vientre. · Vuelve a tener fiebre o tiene fiebre más gus. · Gala heces son negruzcas y parecidas al alquitrán o tienen rastros de [place]. · Tiene sangrado vaginal inesperado. · Tiene síntomas de konstantin infección del tracto urinario. Estos podrían incluir:  ¨ Dolor al [place]. ¨ Orinar con más frecuencia que lo habitual.  ¨ [place] en la [place]. · Siente mareos o aturdimiento, o que está a punto de [place]. Preste especial atención a los cambios en reyna velvet y asegúrese de comunicarse con reyna médico si:  · No está mejorando después de 1 día (24 horas). ¿Dónde puede encontrar más información en inglés? [place] a http://césar-clovis.info/. [employer] F732 en la búsqueda para aprender más acerca de \"Dolor abdominal: Instrucciones de cuidado - [ Abdominal Pain: Care Instructions ]. \"  Revisado: 20 marzo, 2017  Versión del contenido: 11.3  © 6878-6500 [employer]. Las instrucciones de cuidado fueron adaptadas bajo licencia por [employer] (which disclaims liability or warranty for this information).  Si usted tiene [place] afección médica o sobre estas instrucciones, siempre pregunte a reyna profesional de velvet. [employer] niega toda garantía o responsabilidad por reyna uso de esta información. We hope that we have addressed all of your medical concerns. The examination and treatment you received in the Emergency Department were for an emergent problem and were not intended as complete care. It is important that you follow up with your healthcare provider(s) for ongoing care. If your symptoms worsen or do not improve as expected, and you are unable to reach your usual health care provider(s), you should return to the Emergency Department. Today's healthcare is undergoing tremendous change, and patient satisfaction surveys are one of the many tools to assess the quality of medical care. You may receive a survey from the [facility] regarding your experience in the Emergency Department. I hope that your experience has been completely positive, particularly the medical care that I provided. As such, please participate in the survey; anything less than excellent does not meet my expectations or intentions. [facility] and [address] [place] [address] participate in nationally recognized quality of care measures. If your blood pressure is greater than 120/80, as reported below, we urge that you seek medical care to address the potential of high blood pressure, commonly known as hypertension. Hypertension can be hereditary or can be caused by certain medical conditions, pain, stress, or \"white coat syndrome. \"       Please make an appointment with your health care provider(s) for follow up of your Emergency Department visit. VITALS:   Patient Vitals for the past 8 hrs:   Temp Pulse Resp BP SpO2   09/11/17 2215 98.4 °F (36.9 °C) 72 16 138/85 98 %          Thank you for allowing us to provide you with medical care today.   We realize that you have many choices for your emergency care needs. Please choose us in the future for any continued health care needs. Kapil Burkett, [facility].   Office: [phone]            Recent Results (from the past 24 hour(s))   CBC WITH AUTOMATED DIFF    Collection Time: 09/11/17 10:45 PM   Result Value Ref Range    WBC 7.5 3.6 - 11.0 K/uL    RBC 4.61 3.80 - 5.20 M/uL    HGB 12.4 11.5 - 16.0 g/dL    HCT 38.3 35.0 - 47.0 %    MCV 83.1 80.0 - 99.0 FL    MCH 26.9 26.0 - 34.0 PG    MCHC 32.4 30.0 - 36.5 g/dL    RDW 13.3 11.5 - 14.5 %    PLATELET 395 356 - 493 K/uL    NEUTROPHILS 47 32 - 75 %    LYMPHOCYTES 36 12 - 49 %    MONOCYTES 9 5 - 13 %    EOSINOPHILS 7 0 - 7 %    BASOPHILS 1 0 - 1 %    ABS. NEUTROPHILS 3.6 1.8 - 8.0 K/UL    ABS. LYMPHOCYTES 2.7 0.8 - 3.5 K/UL    ABS. MONOCYTES 0.7 0.0 - 1.0 K/UL    ABS. EOSINOPHILS 0.5 (H) 0.0 - 0.4 K/UL    ABS. BASOPHILS 0.0 0.0 - 0.1 K/UL   METABOLIC PANEL, COMPREHENSIVE    Collection Time: 09/11/17 10:45 PM   Result Value Ref Range    Sodium 140 136 - 145 mmol/L    Potassium 3.6 3.5 - 5.1 mmol/L    Chloride 105 97 - 108 mmol/L    CO2 24 21 - 32 mmol/L    Anion gap 11 5 - 15 mmol/L    Glucose 106 (H) 65 - 100 mg/dL    BUN 12 6 - 20 MG/DL    Creatinine 0.66 0.55 - 1.02 MG/DL    BUN/Creatinine ratio 18 12 - 20      GFR est AA >60 >60 ml/min/1.73m2    GFR est non-AA >60 >60 ml/min/1.73m2    Calcium 9.4 8.5 - 10.1 MG/DL    Bilirubin, total 0.2 0.2 - 1.0 MG/DL    ALT (SGPT) 133 (H) 12 - 78 U/L    AST (SGOT) 79 (H) 15 - 37 U/L    Alk.  phosphatase 72 45 - 117 U/L    Protein, total 7.3 6.4 - 8.2 g/dL    Albumin 3.2 (L) 3.5 - 5.0 g/dL    Globulin 4.1 (H) 2.0 - 4.0 g/dL    A-G Ratio 0.8 (L) 1.1 - 2.2     LIPASE    Collection Time: 09/11/17 10:45 PM   Result Value Ref Range    Lipase 236 73 - 393 U/L   URINALYSIS W/MICROSCOPIC    Collection Time: 09/11/17 10:45 PM   Result Value Ref Range    Color YELLOW/STRAW      Appearance CLEAR CLEAR      Specific gravity 1.029 1.003 - 1.030      pH (UA) 6.0 5.0 - 8.0      Protein NEGATIVE  NEG mg/dL    Glucose NEGATIVE  NEG mg/dL    Ketone NEGATIVE  NEG mg/dL    Bilirubin NEGATIVE  NEG      Blood NEGATIVE  NEG      Urobilinogen 0.2 0.2 - 1.0 EU/dL    Nitrites NEGATIVE  NEG      Leukocyte Esterase NEGATIVE  NEG      WBC 0-4 0 - 4 /hpf    RBC 0-5 0 - 5 /hpf    Epithelial cells FEW FEW /lpf    Bacteria NEGATIVE  NEG /hpf    CA Oxalate crystals 2+ (A) NEG   URINE CULTURE HOLD SAMPLE    Collection Time: 09/11/17 10:45 PM   Result Value Ref Range    Urine culture hold URINE ON HOLD IN MICROBIOLOGY DEPT FOR 3 DAYS     HCG URINE, QL. - POC    Collection Time: 09/11/17 10:49 PM   Result Value Ref Range    Pregnancy test,urine (POC) NEGATIVE  NEG         Ct Abd Pelv W Cont    Result Date: 9/12/2017  EXAM:  CT ABD PELV W CONT INDICATION: Abdominal pain. Diarrhea COMPARISON: 4/30/2017 CONTRAST:  100 mL of Isovue-370. TECHNIQUE: Following the uneventful intravenous administration of contrast, thin axial images were obtained through the abdomen and pelvis. Coronal and sagittal reconstructions were generated. Oral contrast was not administered. CT dose reduction was achieved through use of a standardized protocol tailored for this examination and automatic exposure control for dose modulation. FINDINGS: LUNG BASES: Clear. INCIDENTALLY IMAGED HEART AND MEDIASTINUM: Unremarkable. LIVER: No mass or biliary dilatation. GALLBLADDER: Status post cholecystectomy. SPLEEN: No mass. PANCREAS: No mass or ductal dilatation. ADRENALS: Unremarkable. KIDNEYS: No mass, calculus, or hydronephrosis. STOMACH: Unremarkable. SMALL BOWEL: No dilatation or wall thickening. COLON: No dilatation or wall thickening. APPENDIX: Unremarkable. PERITONEUM: No ascites or pneumoperitoneum. RETROPERITONEUM: No lymphadenopathy or aortic aneurysm. REPRODUCTIVE ORGANS: The uterus and ovaries are unremarkable. URINARY BLADDER: No mass or calculus. BONES: No destructive bone lesion. ADDITIONAL COMMENTS: N/A     IMPRESSION: No evidence of acute process.

## 2017-09-12 NOTE — ED PROVIDER NOTES
HPI Comments: 29 yo female with hx of cholecystectomy here for evaluation of diarrhea and abdominal pain that began 5-6 days ago. States pain above umbilicus. [place] N/V. States also with HA x tonight at 1700. Band across forehead. [place] fever, chills, CP, SOB, flank pain, urinary symptoms. Non smoker. Patient is a 28 y.o. female presenting with abdominal pain, diarrhea, and headaches. The history is provided by the patient. Abdominal Pain    This is a new problem. The current episode started more than 2 days ago. The problem occurs constantly. The pain is located in the periumbilical region. The quality of the pain is aching. The pain is at a severity of 8/10. The pain is moderate. Associated symptoms include diarrhea and headaches. Pertinent negatives include no anorexia, no fever, no nausea, no vomiting and no chest pain. Nothing worsens the pain. The pain is relieved by nothing. Diarrhea    Associated symptoms include diarrhea and headaches. Pertinent negatives include no anorexia, no fever, no nausea, no vomiting and no chest pain. Headache    Pertinent negatives include no anorexia, no fever, no nausea and no vomiting. History reviewed. No pertinent past medical history. Past Surgical History:   Procedure Laterality Date    HX LAP CHOLECYSTECTOMY           History reviewed. No pertinent family history. Social History     Social History    Marital status:      Spouse name: N/A    Number of children: N/A    Years of education: N/A     Occupational History    Not on file. Social History Main Topics    Smoking status: Never Smoker    Smokeless tobacco: Never Used    Alcohol use No    Drug use: No    Sexual activity: Not on file     Other Topics Concern    Not on file     Social History Narrative    ** Merged History Encounter **         ** Merged History Encounter **              ALLERGIES: Dilaudid [hydromorphone (bulk)];  Other medication; and Vancomycin    Review of Systems   Constitutional: Negative for activity change and fever. HENT: Negative for facial swelling. Eyes: Negative for discharge. Respiratory: Negative for cough. Cardiovascular: Negative for chest pain and leg swelling. Gastrointestinal: Positive for abdominal pain and diarrhea. Negative for abdominal distention, anorexia, nausea and vomiting. Skin: Negative for color change. Neurological: Positive for headaches. Negative for seizures and syncope. Psychiatric/Behavioral: Negative for behavioral problems. Vitals:    09/11/17 2215   BP: 138/85   Pulse: 72   Resp: 16   Temp: 98.4 °F (36.9 °C)   SpO2: 98%   Weight: 121.6 kg (268 lb 2 oz)   Height: 5' 5\" (1.651 m)            Physical Exam   Constitutional: She is oriented to person, place, and time. She appears well-developed and well-nourished. She appears distressed (mild). HENT:   Head: Normocephalic and atraumatic. Right Ear: External ear normal.   Left Ear: External ear normal.   Nose: Nose normal.   Mouth/Throat: Oropharynx is clear and moist.   Frontal HA   Eyes: Conjunctivae and EOM are normal. Pupils are equal, round, and reactive to light. Right eye exhibits no discharge. Left eye exhibits no discharge. Neck: Normal range of motion. Neck supple. No meningeal signs    Cardiovascular: Normal rate, regular rhythm, normal heart sounds and intact distal pulses. Pulmonary/Chest: Effort normal and breath sounds normal.   Abdominal: Soft. Bowel sounds are normal. She exhibits no distension. There is tenderness (Periumbilical). There is no rebound and no guarding. Musculoskeletal: Normal range of motion. She exhibits no edema or tenderness. Lymphadenopathy:     She has no cervical adenopathy. Neurological: She is alert and oriented to person, place, and time. She is not disoriented. No cranial nerve deficit. Coordination normal.   Skin: Skin is warm and dry. No rash noted.    Psychiatric: She has a normal mood and affect. Her behavior is normal. Judgment and thought content normal.   Nursing note and vitals reviewed. MDM  Number of Diagnoses or Management Options  Abdominal pain, generalized:   Diarrhea, unspecified type:   Nonintractable headache, unspecified chronicity pattern, unspecified headache type:      Amount and/or Complexity of Data Reviewed  Clinical lab tests: ordered and reviewed  Tests in the radiology section of CPT®: ordered and reviewed  Discuss the patient with other providers: yes  Independent visualization of images, tracings, or specimens: yes      ED Course       Procedures    Patient has been reassessed. Feeling better; sleeping in room. Reviewed labs, medications and radiographics with patient. Ready to discharge home. Will follow up with GI. Discussed case with attending Physician. Agrees with care and will D/C with follow up. Patient's results have been reviewed with them. Patient and/or family have verbally conveyed their understanding and agreement of the patient's signs, symptoms, diagnosis, treatment and prognosis and additionally agree to follow up as recommended or return to the Emergency Room should their condition change prior to follow-up. Discharge instructions have also been provided to the patient with some educational information regarding their diagnosis as well a list of reasons why they would want to return to the ER prior to their follow-up appointment should their condition change.   HEMALATHA Phillip

## 2017-09-14 ENCOUNTER — OFFICE VISIT (OUTPATIENT)
Dept: FAMILY MEDICINE CLINIC | Age: 32
End: 2017-09-14

## 2017-09-14 ENCOUNTER — HOSPITAL ENCOUNTER (OUTPATIENT)
Dept: LAB | Age: 32
Discharge: HOME | End: 2017-09-14

## 2017-09-14 VITALS
WEIGHT: 263.4 LBS | HEART RATE: 68 BPM | TEMPERATURE: 98.3 F | SYSTOLIC BLOOD PRESSURE: 131 MMHG | BODY MASS INDEX: 43.83 KG/M2 | DIASTOLIC BLOOD PRESSURE: 85 MMHG

## 2017-09-14 DIAGNOSIS — R51.9 ACUTE NONINTRACTABLE HEADACHE, UNSPECIFIED HEADACHE TYPE: ICD-10-CM

## 2017-09-14 DIAGNOSIS — R79.89 ELEVATED LFTS: Primary | ICD-10-CM

## 2017-09-14 DIAGNOSIS — R79.89 ELEVATED LFTS: ICD-10-CM

## 2017-09-14 LAB
ALBUMIN SERPL-MCNC: 3.8 G/DL (ref 3.5–5)
ALBUMIN/GLOB SERPL: 1 {RATIO} (ref 1.1–2.2)
ALP SERPL-CCNC: 74 U/L (ref 45–117)
ALT SERPL-CCNC: 136 U/L (ref 12–78)
AST SERPL-CCNC: 71 U/L (ref 15–37)
BILIRUB DIRECT SERPL-MCNC: <0.1 MG/DL (ref 0–0.2)
BILIRUB SERPL-MCNC: 0.4 MG/DL (ref 0.2–1)
EST. AVERAGE GLUCOSE BLD GHB EST-MCNC: 120 MG/DL
GLOBULIN SER CALC-MCNC: 4 G/DL (ref 2–4)
HBA1C MFR BLD: 5.8 % (ref 4.2–6.3)
PROT SERPL-MCNC: 7.8 G/DL (ref 6.4–8.2)

## 2017-09-14 PROCEDURE — 86708 HEPATITIS A ANTIBODY: CPT | Performed by: NURSE PRACTITIONER

## 2017-09-14 PROCEDURE — 86803 HEPATITIS C AB TEST: CPT | Performed by: NURSE PRACTITIONER

## 2017-09-14 PROCEDURE — 86692 HEPATITIS DELTA AGENT ANTBDY: CPT | Performed by: NURSE PRACTITIONER

## 2017-09-14 PROCEDURE — 80076 HEPATIC FUNCTION PANEL: CPT | Performed by: NURSE PRACTITIONER

## 2017-09-14 PROCEDURE — 83036 HEMOGLOBIN GLYCOSYLATED A1C: CPT | Performed by: NURSE PRACTITIONER

## 2017-09-14 PROCEDURE — 87340 HEPATITIS B SURFACE AG IA: CPT | Performed by: NURSE PRACTITIONER

## 2017-09-14 RX ORDER — BUTALBITAL, ACETAMINOPHEN AND CAFFEINE 300; 40; 50 MG/1; MG/1; MG/1
1 CAPSULE ORAL
Qty: 15 CAP | Refills: 1 | Status: SHIPPED | OUTPATIENT
Start: 2017-09-14 | End: 2019-02-22 | Stop reason: ALTCHOICE

## 2017-09-14 NOTE — PROGRESS NOTES
Subjective:     Chief Complaint   Patient presents with   [facility] Follow Up     For diarrhea, headache and abd pain         She  is a 28 y.o. female who presents for s/p ER evaluation of abdominal pain. Pt reports her abdominal pain/diarrhea has improved but she still has h/a. Still having 4 loose stools daily and has improved compared. Poor relief from 400mg ibuprofen and Tylenol. H/A is mostly frontal.     No new N/V nor blood in stool. Pt notes stools are not bloody nor unusual odor. ROS  Gen - no fever/chills  Resp - no dyspnea or cough  CV - no chest pain or RENDON  Rest per HPI    No past medical history on file. Past Surgical History:   Procedure Laterality Date    HX LAP CHOLECYSTECTOMY       Current Outpatient Prescriptions on File Prior to Visit   Medication Sig Dispense Refill    ondansetron (ZOFRAN ODT) 4 mg disintegrating tablet Take 1 Tab by mouth every eight (8) hours as needed for Nausea for up to 10 days. 12 Tab 0    dicyclomine (BENTYL) 20 mg tablet Take 1 Tab by mouth every six (6) hours for 20 doses. 20 Tab 0    omeprazole (PRILOSEC) 20 mg capsule Take 1 Cap by mouth daily. 20 Cap 0     No current facility-administered medications on file prior to visit.          Objective:     Vitals:    09/14/17 1317   BP: 131/85   Pulse: 68   Temp: 98.3 °F (36.8 °C)   TempSrc: Oral   Weight: 263 lb 6.4 oz (119.5 kg)       Physical Examination:  General appearance - alert, well appearing, and in no distress  Eyes -sclera anicteric  Neck - supple, no significant adenopathy, no thyromegaly  Chest - clear to auscultation, no wheezes, rales or rhonchi, symmetric air entry  Heart - normal rate, regular rhythm, normal S1, S2, no murmurs, rubs, clicks or gallops  Neurological - alert, oriented, no focal findings or movement disorder noted  Abdomen-BS present/WNL x 4 quads, non-tender/distended, soft,no organomegaly (exam limited by obesity)     Assessment/ Plan:   Diagnoses and all orders for this visit:    1. Elevated LFTs  -     HEPATIC FUNCTION PANEL; Future  -     HEPATITIS C AB; Future  -     HEP B SURFACE AG; Future  -     HEPATITIS D TOTAL; Future  -     HEP A AB, TOTAL; Future  -     HEMOGLOBIN A1C WITH EAG; Future    2. Acute nonintractable headache, unspecified headache type  -     butalbital-acetaminophen-caff (FIORICET) -40 mg per capsule; Take 1 Cap by mouth every four (4) hours as needed for Pain. Max Daily Amount: 6 Caps. CT scans and other labs from ED WNL, except noted elevation of LFTs. Pt notes recent birth 4-5 [place] ago in this country where she was likely screen for Hep C and B. Repeat LFTs and hepatitis panels. PRN Fioricet for h/a, counseled Pt against freq NSAID use r/t GI SEs and potential for sedation/sparing use of Fioricet. Re-eval in 3-4 weeks to discuss symptoms and Hep panels. Consider GI referral if hep neg or liver institute if Hep panels +. Screen for DM 2. I have discussed the diagnosis with the patient and the intended plan as seen in the above orders. The patient has received an after-visit summary and questions were answered concerning future plans. I have discussed medication side effects and warnings with the patient as well. The patient verbalizes understanding and agreement with the plan.     Follow-up Disposition: Not on File

## 2017-09-14 NOTE — MR AVS SNAPSHOT
Visit Information Susan craig Juanita Personal Médico Departamento Teléfono del Dep. Número de visita 9/14/2017  1:15 PM Peyman Mccartney, [address] [phone] Follow-up Instructions Return in about 3 weeks (around 10/5/2017). Your Appointments 10/23/2017  8:30 AM  
PAP with HEMALATHA Clayton [address] [place] ([facility]) Appt Note: pap [address]  
  
   
 [facility] Upcoming Health Maintenance Date Due DTaP/Tdap/Td series (1 - Tdap) 4/23/2006 PAP AKA CERVICAL CYTOLOGY 4/23/2006 INFLUENZA AGE 9 TO ADULT 8/1/2017 Alergias  Review Complete El: 9/14/2017 Por: Peyman Mccartney, LARA Long del:  9/14/2017 Intensidad Anotado Tipo de reacción [employer] Dilaudid [Hydromorphone (Bulk)]  02/23/2016    Itching Other Medication  07/28/2014    Swelling Pt c/o of swelling in body after pain medication administered to her after surgical procedure,  Pt does not know what medication it was. Vancomycin  08/06/2013    Hives Vacunas actuales Fariha Nissa No hay ninguna vacuna archivada. No revisadas esta visita You Were Diagnosed With   
  
 Redd Bench Elevated LFTs    -  Primary ICD-10-CM: R94.5 ICD-9-CM: 790.6 Acute nonintractable headache, unspecified headache type     ICD-10-CM: R51 ICD-9-CM: 784.0 Partes vitales PS Pulso Temperatura Peso (percentil de crecimiento) LMP (última chavez) BMI (IMC)  
 131/85 (BP 1 Location: Left arm, BP Patient Position: Sitting) 68 98.3 °F (36.8 °C) (Oral) 263 lb 6.4 oz (119.5 kg) 08/28/2017 (Exact Date) 43.83 kg/m2 Estado obstétrico Estatus de tabaquísmo Having regular periods Never Smoker Historial de signos vitales BMI and BSA Data  Body Mass Index Body Surface Area  
 43.83 kg/m 2 2.34 m 2  
  
 [facility] Name Phone [facility] [address] [address] [place] lista de medicamentos actualizada Lista actualizada el: 9/14/17  1:55 PM.  Jhonatan Carrera use richter lista de medicamentos más reciente. butalbital-acetaminophen-caff -40 mg per capsule También conocido wilner:  FIORICET Take 1 Cap by mouth every four (4) hours as needed for Pain. Max Daily Amount: 6 Caps. dicyclomine 20 mg tablet También conocido wilner:  BENTYL Take 1 Tab by mouth every six (6) hours for 20 doses. omeprazole 20 mg capsule También conocido wilner:  [address] Take 1 Cap by mouth daily. ondansetron 4 mg disintegrating tablet También conocido wilner:  ZOFRAN ODT Take 1 Tab by mouth every eight (8) hours as needed for Nausea for up to 10 days. Impresion de recetas Refills  
 butalbital-acetaminophen-caff (FIORICET) -40 mg per capsule 1 Sig: Take 1 Cap by mouth every four (4) hours as needed for Pain. Max Daily Amount: 6 Caps. Class: Print Route: Oral  
  
Instrucciones de seguimiento Return in about 3 weeks (around 10/5/2017). Por hacer 09/14/2017 Lab:  HEMOGLOBIN A1C WITH EAG   
  
 09/14/2017 Lab:  HEP A AB, TOTAL   
  
 09/14/2017 Lab:  HEP B SURFACE AG   
  
 09/14/2017 Lab:  HEPATIC FUNCTION PANEL   
  
 09/14/2017 Lab:  HEPATITIS C AB   
  
 09/14/2017 Lab:  HEPATITIS D TOTAL Instrucciones para el Paciente Butalbital/Acetaminophen/Caffeine (Por la boca) Se [place] para tratar los anshul de [place]. Rufino(s) : Capacet, Esgic, Fioricet, Margesic, Kent Kassy, Zebutal  
Existen muchas otras marcas de Dueñas. Rhona medicamento no debe ser usado cuando:  
Rhona medicamento no es adecuado para todas las personas. No lo use si ha tenido konstantin reacción alérgica al acetaminofén, butalbital, o a la cafeína, o si usted tiene mary beth. [place] de usar rhona medicamento:  
Jackie Nares · [place] hailee medicamentos wilner se le haya indicado. No use más cantidad de medicamento ni lo tome con más frecuencia de lo que reyna médico le indique. · Mida el líquido oral con Ernestina Meadows, [place] para uso oral o taza especialmente marcadas para medir medicamentos. · Rhona medicamento no es para uso a anuja plazo. · Guarde el medicamento en un recipiente cerrado a temperatura ambiente y alejado del calor, la humedad y la kathrine directa. Medicamentos y [employer] que debe evitar:  
Consulte con reyna médico o farmacéutico antes de usar cualquier medicamento, incluyendo los que compra sin receta médica, las vitaminas y los productos herbales. · Algunos medicamentos pueden afectar la función de to-BBB. Infórmele a reyna médico si usted [employer] un inhibidor de la monoamino oxidasa MAO. · Informe a reyna médico si usted [place] cualquier cosa que le provoca sueño. [place]  son medicamentos para alergia o medicamentos narcóticos para el dolor y el alcohol. · No consuma alcohol mientras esté . El acetaminofeno puede dañar reyna hígado y el alcohol puede aumentar rhoan riesgo. Precauciones ana el uso de Tammy medicamento: · Informe a reyna médico si está embarazada o dando de lactar o si sufre konstantin enfermedad del hígado o Brianna Vasquez o problemas estomacales. Infórmele a reyna médico si tiene antecedentes de adicción al alcohol o a las drogas. · Rhona medicamento puede provocarle los siguientes problemas: 
¨ Daño hepático 
¨ Reacciones graves en la piel · Rhona medicamento contiene acetaminofén. Carissa las etiquetas de todos los demás medicamentos que esté usando para saber si también contienen acetaminofén, o pregunte a reyna médico o farmacéutico. No use más de 4 gramos (4000 miligramos) en total de acetaminofeno en un día. · Rhona medicamento podría causarle a usted mareos o somnolencia.  No maneje ni carlito otra tarea que pueda ser peligrosa hasta que sepa cómo le afecta rhona medicamento. · Rhona medicamento puede convertirse en un hábito. No use más de la dosis prescrita. Llame a reyna médico si usted siente que el medicamento no le está funcionando. · Dígale a todo médico o dentista encargado de atenderle que usted está usando [employer]. Puede que rhona medicamento afecte algunos resultados de [employer]. · Guarde todos los medicamentos fuera del alcance de los niños. Nunca comparta hailee medicamentos con "[employer]. Efectos secundarios que pueden presentarse ana el uso de rhona medicamento:  
Consulte inmediatamente con el médico si nota cualquiera de estos efectos secundarios: 
· Reacción alérgica: Comezón o ronchas, hinchazón del quincy o las kasandra, hinchazón u hormigueo en la boca o garganta, opresión en el pecho, dificultad para respirar · Ampollas, despellejamiento, sarpullido lopez en la piel. · Orina oscura o heces pálidas, náuseas, vómitos, falta de apetito, dolor estomacal, coloración amarillenta en la piel u ojos · Mareos o debilidad extremos, dificultad para respirar, ritmo cardíaco lento, convulsiones y piel fría y Fide · Desvanecimientos, mareos, desmayos Consulte con el médico si nota los siguientes efectos secundarios menos graves:  
· Náuseas o vómitos moderados · Somnolencia, cansancio Consulte con el médico si nota otros efectos secundarios que jenny son causados por rhona medicamento. Llame a reyna médico para consultarle Rodolfo. Usted puede notificar hailee efectos secundarios al FDA al [phone]-[phone]. © 2017 [facility] Information is for End User's use only and may not be sold, redistributed or otherwise used for commercial purposes. Esta información es sólo para uso en educación. Reyna intención no es darle un consejo médico sobre enfermedades o tratamientos.  Colsulte con reyna Soledad Hernandez farmacéutico antes de seguir cualquier régimen médico para saber si es seguro y efectivo para usted. Introducing [facility]! [facility] introduce portal paciente MyChart . Ahora se puede acceder a partes de reyna expediente médico, enviar por correo electrónico la oficina de reyna médico y solicitar renovaciones de medicamentos en línea. En reyna navegador de Internet , Nighat Mccall a [email or web address] Carlito clic en el usuario por Mary Beth Butter? Janelle Lanier clic aquí en la sesión [facility]. Verá la página de registro [place]. Ingrese reyna código de [facility] Dorothy emily y wilner aparece a continuación. Usted no tendrá que [employer] código después de josse completado el proceso de registro . Si usted no se inscribe antes de la fecha de caducidad , debe solicitar un nuevo código. · MyChart Código de acceso : [id number] Expires: 12/11/2017  1:11 AM 
 
Ingresa los últimos cuatro dígitos de reyna Número de Seguro Social ( xxxx ) y fecha de nacimiento ( dd / mm / aaaa ) wilner se indica y carlito clic en Enviar. Usted será llevado a la siguiente página de registro . Crear un ID MyChart . Esta será reyna ID de inicio de sesión de MyChart y no puede ser [place] , por lo que pensar en konstantin que es Clara Yessi y fácil de recordar . Crear konstantin contraseña MyChart . Usted puede cambiar reyna contraseña en cualquier momento . Ingrese reyna Password Reset de preguntas y Mart . [place] se puede utilizar en un momento posterior si usted olvida reyna contraseña. Introduzca reyna dirección de correo electrónico . Emilia Burger recibirá konstantin notificación por correo electrónico cuando la nueva información está disponible en MyChart . Cheron Miky siddiqi en Registrarse. Zulema Milling alvaro y descargar porciones de reyna expediente médico. 
Carlito neeru en el enlace de descarga del menú Resumen para descargar konstantin copia portátil de reyna información médica . Si tiene [employer] , por favor visite la sección de preguntas frecuentes del sitio web MyChart . Recuerde, MyChart NO es que se utilizará para las necesidades urgentes. Para emergencias médicas , llame al 911 . Ahora disponible en reyna iPhone y Android ! Por favor proporcione iqra resumen de la documentación de cuidado a reyna próximo proveedor. Your primary care clinician is listed as NONE. If you have any questions after today's visit, please call [phone].

## 2017-09-14 NOTE — PATIENT INSTRUCTIONS
Butalbital/Acetaminophen/Caffeine (Por la boca)   Se utiliza para tratar los anshul de [place]. Rufino(s) : Capacet, Esgic, Fioricet, Margesic, Mayes Kassy, Zebutal   Existen muchas otras marcas de Dueñas. Rhona medicamento no debe ser usado cuando:   Rhona medicamento no es adecuado para todas las personas. No lo use si ha tenido konstantin reacción alérgica al acetaminofén, butalbital, o a la cafeína, o si usted tiene mary beth. Forma de usar rhona medicamento:   [place], [place], [place]  · [employer] hailee medicamentos wilner se le haya indicado. No use más cantidad de medicamento ni lo tome con más frecuencia de lo que reyna médico le indique. · Mida el líquido oral con Cori Oscar, [place] para uso oral o taza especialmente marcadas para medir medicamentos. · Rhona medicamento no es para uso a anuja plazo. · Guarde el medicamento en un recipiente cerrado a temperatura ambiente y alejado del calor, la humedad y la kathrine directa. Medicamentos y [employer] que debe evitar:   Consulte con reyna médico o farmacéutico antes de usar cualquier medicamento, incluyendo los que compra sin receta médica, las vitaminas y los productos herbales. · Algunos medicamentos pueden afectar la función de Erasmo. Infórmele a reyna médico si usted [employer] un inhibidor de la monoamino oxidasa MAO. · Informe a reyna médico si usted [place] cualquier cosa que le provoca sueño. [place] son medicamentos para alergia o medicamentos narcóticos para el dolor y el alcohol. · No consuma alcohol mientras esté . El acetaminofeno puede dañar reyna hígado y el alcohol puede aumentar rhona riesgo. Precauciones ana el uso de rhona medicamento:   · Informe a reyna médico si está embarazada o dando de lactar o si sufre konstantin enfermedad del hígado o Carito Tong o problemas estomacales. Infórmele a reyna médico si tiene antecedentes de adicción al alcohol o a las drogas.   · Rhona medicamento puede provocarle los siguientes problemas:  ¨ Daño hepático  ¨ Reacciones graves en la piel  · Rhona medicamento contiene acetaminofén. Carissa las etiquetas de todos los demás medicamentos que esté usando para saber si también contienen acetaminofén, o pregunte a reyna médico o farmacéutico. No use más de 4 gramos (4000 miligramos) en total de acetaminofeno en un día. · Rhona medicamento podría causarle a usted mareos o somnolencia. No maneje ni carlito otra tarea que pueda ser peligrosa hasta que sepa cómo le afecta rhona medicamento. · Rhona medicamento puede convertirse en un hábito. No use más de la dosis prescrita. Llame a reyna médico si usted siente que el medicamento no le está funcionando. · Dígale a todo médico o dentista encargado de atenderle que usted está usando [employer]. Puede que rhona medicamento afecte algunos resultados de [email or web address]. · Guarde todos los medicamentos fuera del alcance de los niños. Nunca comparta hailee medicamentos con [employer]. Efectos secundarios que pueden presentarse ana el uso de rhona medicamento:   Consulte inmediatamente con el médico si nota cualquiera de estos efectos secundarios:  · Reacción alérgica: Comezón o ronchas, hinchazón del quincy o las kasandra, hinchazón u hormigueo en la boca o garganta, opresión en el pecho, dificultad para respirar  · Ampollas, despellejamiento, sarpullido lopez en la piel. · Orina oscura o heces pálidas, náuseas, vómitos, falta de apetito, dolor estomacal, coloración amarillenta en la piel u ojos  · Mareos o debilidad extremos, dificultad para respirar, ritmo cardíaco lento, convulsiones y piel fría y húmeda  · Desvanecimientos, mareos, desmayos  Consulte con el médico si nota los siguientes efectos secundarios menos graves:   · Náuseas o vómitos moderados  · Somnolencia, cansancio  Consulte con el médico si nota otros efectos secundarios que jenny son causados por rhona medicamento. Llame a reyna médico para consultarle Rodolfo.  Usted puede notificar hailee efectos secundarios al FDA al [phone]-[phone]. © 2017 [address]  Information is for End User's use only and may not be sold, redistributed or otherwise used for commercial purposes. Esta información es sólo para uso en educación. Reyna intención no es darle un consejo médico sobre enfermedades o tratamientos. Colsulte con reyna [place] farmacéutico antes de seguir cualquier régimen médico para saber si es seguro y efectivo para usted.

## 2017-09-14 NOTE — PROGRESS NOTES
Coordination of Care  1. Have you been to the ER, urgent care clinic since your last visit? Hospitalized since your last visit? Yes Reason for visit: 9/12/17, Diarrhea, headache, abd pain, [place] [facility]    2. Have you seen or consulted any other health care providers outside of the [address] [place] [address] since your last visit? Include any pap smears or colon screening. No    Medications  Medication Reconciliation Performed: yes  Patient does not need refills     Learning Assessment Complete?  yes

## 2017-09-15 LAB
HBV SURFACE AG SER QL: 0.18 INDEX
HBV SURFACE AG SER QL: NEGATIVE
HCV AB SERPL QL IA: NONREACTIVE
HCV COMMENT,HCGAC: NORMAL

## 2017-09-16 LAB — HAV AB SER QL IA: POSITIVE

## 2017-09-22 LAB — HDV AB SER QL IA: NEGATIVE

## 2017-10-23 ENCOUNTER — HOSPITAL ENCOUNTER (OUTPATIENT)
Dept: LAB | Age: 32
Discharge: HOME | End: 2017-10-23

## 2017-10-23 ENCOUNTER — OFFICE VISIT (OUTPATIENT)
Dept: FAMILY MEDICINE CLINIC | Age: 32
End: 2017-10-23

## 2017-10-23 VITALS
BODY MASS INDEX: 43.43 KG/M2 | HEART RATE: 79 BPM | WEIGHT: 261 LBS | DIASTOLIC BLOOD PRESSURE: 74 MMHG | TEMPERATURE: 98.9 F | SYSTOLIC BLOOD PRESSURE: 132 MMHG

## 2017-10-23 DIAGNOSIS — F32.89 OTHER DEPRESSION: ICD-10-CM

## 2017-10-23 DIAGNOSIS — Z01.419 WELL WOMAN EXAM: Primary | ICD-10-CM

## 2017-10-23 DIAGNOSIS — F52.0 LACK OF LIBIDO: ICD-10-CM

## 2017-10-23 PROCEDURE — 88142 CYTOPATH C/V THIN LAYER: CPT | Performed by: PHYSICIAN ASSISTANT

## 2017-10-23 NOTE — PROGRESS NOTES
Coordination of Care  1. Have you been to the ER, urgent care clinic since your last visit? Hospitalized since your last visit? No    2. Have you seen or consulted any other health care providers outside of the [address] [place] [address] since your last visit? Include any pap smears or colon screening. No    Medications  Does the patient need refills? N/A    Learning Assessment Complete?  yes

## 2017-10-23 NOTE — MR AVS SNAPSHOT
Visit Information [place] [place] Personal Médico Departamento Teléfono del Dep. Número de visita  
 10/23/2017  8:30 AM Micha Ebonie Kelly [address], 274 [facility] [phone] Follow-up Instructions Return in about 4 weeks (around 11/20/2017). Upcoming Health Maintenance Date Due DTaP/Tdap/Td series (1 - Tdap) 4/23/2006 PAP AKA CERVICAL CYTOLOGY 4/23/2006 INFLUENZA AGE 9 TO ADULT 8/1/2017 Alergias  Review Complete El: 10/23/2017 Por: Stephanie Easton A partir del:  10/23/2017 Intensidad Anotado Tipo de reacción [employer] Dilaudid [Hydromorphone (Bulk)]  02/23/2016    Itching Other Medication  07/28/2014    Swelling Pt c/o of swelling in body after pain medication administered to her after surgical procedure,  Pt does not know what medication it was. Vancomycin  08/06/2013    Hives Vacunas actuales Sheeba Jim No hay ninguna vacuna archivada. No revisadas esta visita You Were Diagnosed With   
  
 Heladioa Avelino Well woman exam    -  Primary ICD-10-CM: I73.014 ICD-9-CM: V72.31 Lack of libido     ICD-10-CM: F52.0 ICD-9-CM: 799.81 Other depression     ICD-10-CM: F32.89 ICD-9-CM: 562 Partes vitales PS Pulso Temperatura Peso (percentil de crecimiento) LMP (última chavez) BMI (IMC)  
 132/74 (BP 1 Location: Left arm, BP Patient Position: Sitting) 79 98.9 °F (37.2 °C) (Oral) 261 lb (118.4 kg) 10/02/2017 43.43 kg/m2 Estado obstétrico Estatus de tabaquísmo Having regular periods Never Smoker Historial de signos vitales BMI and BSA Data Body Mass Index Body Surface Area  
 43.43 kg/m 2 2.33 m 2 [facility] Name Phone [facility] [address] [place] [address] Richter lista de medicamentos actualizada Lista actualizada el: 10/23/17  8:53 AM.  Bernie Michelle use richter lista de medicamentos más reciente. butalbital-acetaminophen-caff -40 mg per capsule También conocido wilner:  FIORICET Take 1 Cap by mouth every four (4) hours as needed for Pain. Max Daily Amount: 6 Caps. omeprazole 20 mg capsule También conocido wilner:  [address] Take 1 Cap by mouth daily. Hicimos lo siguiente PAP, LB, RFX HPV FOCSZ([id number]) [id number] CPT(R)] REFERRAL TO BEHAVIORAL HEALTH [REF8 Custom] Instrucciones de seguimiento Return in about 4 weeks (around 11/20/2017). Informacion de [employer] de Referencia Referido por Referido a  
  
 [id number] DANNA MELENDEZ No disponible Visitas Estado [place] de inicio [place] final  
 1 New Request 10/23/17 10/23/18 Si richter referencia tiene un estado de \"pending review\" o \"denied\" , informacion adicional sera enviada para apoyar el resultado de esta decision. Instrucciones para el Paciente Recuperación de la depresión: Instrucciones de cuidado - [ Clent Lesches From Depression: Care Instructions ] Instrucciones de cuidado Tener un buen cuidado de usted mismo es importante a medida que se recupera de la depresión. Con el tiempo, a medida que el tratamiento funcione hailee síntomas desaparecerán. No lo abandone. Al contrario, concentre richter energía en recuperarse. Richter estado de ánimo mejorará. Solo tomará un tiempo. Concéntrese en cosas que le pueden ayudar a sentirse mejor, wilner estar con amigos o familiares, comer vamsi y descansar lo suficiente. Sheldon tómese las cosas con tranquilidad. No carlito muchas actividades demasiado pronto. Mary Jane Epp a sentirse mejor poco a [address] de seguimiento es konstantin parte clave de richter tratamiento y seguridad. Asegúrese de hacer y acudir a todas las citas, y llame a richter médico si está teniendo problemas. También es konstantin buena idea saber los resultados de los exámenes y mantener konstantin lista de los medicamentos que lacho. Cómo puede cuidarse en el hogar? Sea [employer] · Si debe hacer konstantin tarea que le llevará [place], [place] en varias etapas pequeñas que usted pueda manejar y carlito solo lo que pueda. · Es posible que [place] posponer las decisiones importantes hasta que se haya recuperado de la depresión. Si tiene planes que tendrán un gran impacto en reyna judit, wilner casarse, divorciarse o cambiar de [place], intente esperar un poco. Háblelo con hailee amigos y seres queridos, quienes pueden ayudarle a analizar el panorama completo. · Es importante acercarse a las personas para pedirles ayuda. No se aísle. Deje que reyna linda y [employer]. Encuentre a alguien en quien pueda confiar y hable con moraima persona. · Sea paciente y Toribio mismo. Recuerde que la depresión no es culpa suya y que no es un estado que se pueda superar solo con fuerza de voluntad. La depresión requiere de un tratamiento, wilner cualquier otra enfermedad. Maxi Sow un tiempo sentirse [place], y reyna estado de ánimo mejorará poco a poco. 
Terrilyn Mean · Manténgase ocupado y [address]. Salga a caminar o intente con algún otro ejercicio suave. · Consulte a reyna médico acerca de algún programa de ejercicios. El ejercicio le puede ayudar a aliviar la depresión leve. · Vaya al cine o a un concierto. Participe de [facility] de la parviz o [facility] reunión social. Lacho Imam a alvaro a un partido de fútbol. · Pídale a un amigo que cene con usted. Ileene Raddle · Siga konstantin dieta equilibrada con muchas frutas y verduras frescas, granos integrales y ventura magras de proteínas. Si perdió el apetito, coma pequeños refrigerios en lugar de comidas abundantes. · Evite beber alcohol o usar drogas ilegales. No tome medicamentos que no le hayan recetado a usted. Estos podrían interferir con los medicamentos que pudiera estar tomando para la depresión, o podrían empeorar la depresión. · [employer] medicamentos emily wilner le fueron recetados.  Usted podría empezar a sentirse mejor entre 1 y 3 semanas de estar [employer] Sheldon puede necesitar hasta 6 u 8 semanas para alvaro más mejoría. Hable con reyna médico si tiene preguntas o inquietudes acerca de hailee medicamentos, o si no observa ninguna mejoría en 3 semanas. · Si el [employer] produce algún [employer], avísele a reyna médico. Los antidepresivos pueden provocarle cansancio, mareos o nerviosismo. A algunas personas les produce sequedad en la boca, estreñimiento, anshul de nova, problemas sexuales o diarrea. Muchos de [employer] secundarios son leves y desaparecen por sí solos después de ángela el medicamento ana varias semanas. Otros podrían durar [employer]. Consulte a reyna médico si los efectos secundarios le [employer]. Es posible que pueda probar otro medicamento. · Duerma lo suficiente. Si no puede dormir: ¨ Acuéstese a la misma hora todas las noches y levántese a la misma hora todas las Baker. ¨ Mantenga reyna dormitorio oscuro y silencioso. ¨ No carlito ejercicio después de las 5:00 p.m. [facility] 5:00 p.m. · Evite las pastillas para dormir a menos que hayan sido recetadas por el médico que está tratando reyna depresión. Las pastillas para dormir podrían hacerlo sentir aturdido ana el día e interactuar con otros medicamentos que tome. · Si tiene alguna otra enfermedad, wilner diabetes, enfermedad del corazón o presión arterial gus, asegúrese de continuar con reyna tratamiento. Hable con reyna médico acerca de todos los medicamentos que lacho, incluyendo aquellos con o sin receta. · Guarde los números de estas líneas directas nacionales de prevención del suicidio: 1-703-391-FXEZ ([phone]) y 6-755-UMBVWPJ ([phone]). Si usted o alguien que usted conoce habla sobre el suicidio o acerca de sentirse desesperanzado, consiga ayuda de inmediato. Cuándo debe pedir ayuda?  
Llame al 911 en cualquier momento que considere que necesita atención de urgencia. Por ejemplo, llame si: 
· Siente ganas de lastimarse a sí mismo o a otra persona. · Alguien que conoce está deprimido y está intentando suicidarse o está a punto de hacerlo. Llame a reyna médico ahora mismo o busque atención médica inmediata si: · Oye voces. · Alguien que usted conoce está deprimido y: 
Delories Otter a regalar hailee posesiones. ¨ Usa drogas ilegales o elizabeth alcohol en exceso. ¨ Habla o escribe acerca de la muerte, lo que incluye notas de suicidio o [address] rusty de mckayla, cuchillos o pastillas. ¨ Empieza a pasar mucho tiempo a solas. ¨ Actúa de manera muy agresiva o parece calmado de repente. Preste especial atención a los cambios en reyna velvet y asegúrese de comunicarse con reyna médico si: 
· No mejora wilner se esperaba. Dónde puede encontrar más información en inglés? Richar Sellers a [email or web address] Juanydayana Royal V682 en la búsqueda para aprender más acerca de \"Recuperación de la depresión: Instrucciones de cuidado - [ [place] From Depression: Care Instructions ]. \" 
Revisado: 26 julio, 2016 Versión del contenido: 11.3 © 9638-7773 Healthwise, Incorporated. Las instrucciones de cuidado fueron adaptadas bajo licencia por Good Help Connections (which disclaims liability or warranty for this information). Si usted tiene [place] afección médica o sobre estas instrucciones, siempre pregunte a reyna profesional de velvet. Healthwise, Incorporated niega toda garantía o responsabilidad por reyna uso de esta información. Introducing [facility]! [facility] introduce portal paciente MyChart . Ahora se puede acceder a partes de reyna expediente médico, enviar por correo electrónico la oficina de reyna médico y solicitar renovaciones de medicamentos en línea. En reyna navegador de Internet , Feliberto Mar a [email or web address] Sylvia neeru en el usuario por Verónica Rucker? Diane Artie siddiqi aquí en la sesión Daryle Neigh. Verá la página de registro [place]. Ingrese reyna código de [employer] emily y wilner aparece a continuación. Usted no tendrá que [employer] código después de josse completado el proceso de registro . Si usted no se inscribe antes de la fecha de caducidad , debe solicitar un nuevo código. · MyChart Código de acceso : [id number] Expires: 12/11/2017  1:11 AM 
 
Ingresa los últimos cuatro dígitos de reyna Número de Seguro Social ( xxxx ) y fecha de nacimiento ( dd / mm / aaaa ) wilner se indica y sylvia clic en Enviar. Usted será llevado a la siguiente página de registro . Crear un ID MyChart . Esta será reyna ID de inicio de sesión de MyChart y no puede ser [place] , por lo que pensar en konstantin que es Tacy Click y fácil de recordar . Crear konstantin contraseña MyChart . Usted puede cambiar reyna contraseña en cualquier momento . Ingrese reyna Password Reset de preguntas y Mart . [place] se puede utilizar en un momento posterior si usted olvida reyna contraseña. Introduzca reyna dirección de correo electrónico . Claudine Curry recibirá konstantin notificación por correo electrónico cuando la nueva información está disponible en MyChart . Sinan siddiqi en Registrarse. Anshul John alvaro y descargar porciones de reyna expediente médico. 
Sylvia clic en el enlace de descarga del menú Resumen para descargar konstantin copia portátil de reyna información médica . Si tiene [employer] , por favor visite la sección de preguntas frecuentes del sitio web MyChart . Recuerde, MyChart NO es que se utilizará para las necesidades urgentes. Para emergencias médicas , llame al 911 . Ahora disponible en reyna iPhone y Android ! Por favor proporcione iqra resumen de la documentación de cuidado a reyna próximo proveedor. Your primary care clinician is listed as NONE. If you have any questions after today's visit, please call [phone].

## 2017-10-23 NOTE — PATIENT INSTRUCTIONS
Recuperación de la depresión: Instrucciones de cuidado - [ Maryana Aquino From Depression: Care Instructions ]  Instrucciones de cuidado  Tener un buen cuidado de usted mismo es importante a medida que se recupera de la depresión. Con el tiempo, a medida que el tratamiento funcione hailee síntomas desaparecerán. No lo abandone. Al contrario, concentre reyna energía en recuperarse. Reyna estado de ánimo mejorará. Solo tomará un tiempo. Concéntrese en cosas que le pueden ayudar a sentirse mejor, wilner estar con amigos o familiares, comer vamsi y descansar lo suficiente. Sheldon tómese las cosas con tranquilidad. No carlito muchas actividades demasiado pronto. Manual Debbie a sentirse mejor poco a [place] de seguimiento es konstantin parte clave de reyna tratamiento y seguridad. Asegúrese de hacer y acudir a todas las citas, y llame a reyna médico si está teniendo problemas. También es konstantin buena idea saber los resultados de los exámenes y mantener konstantin lista de los medicamentos que lacho. ¿Cómo puede cuidarse en el hogar? Sea realista  · Si debe hacer konstantin tarea que le llevará [place], [place] en varias etapas pequeñas que usted pueda manejar y carlito solo lo que pueda. · Es posible que [place] posponer las decisiones importantes hasta que se haya recuperado de la depresión. Si tiene planes que tendrán un gran impacto en reyna judit, wilner casarse, divorciarse o cambiar de [place], intente esperar un poco. Háblelo con hailee amigos y seres queridos, quienes pueden ayudarle a analizar el panorama completo. · Es importante acercarse a las personas para pedirles ayuda. No se aísle. Deje que reyna linda y [employer]. Encuentre a alguien en quien pueda confiar y hable con moraima persona. · Sea paciente y Toribio mismo. Recuerde que la depresión no es culpa suya y que no es un estado que se pueda superar solo con fuerza de voluntad. La depresión requiere de un tratamiento, wilner cualquier otra enfermedad.  Rajni Fees un tiempo sentirse mejor, y reyna estado de ánimo mejorará poco a poco.  Manténgase activo  · Manténgase ocupado y [address]. Salga a caminar o intente con algún otro ejercicio suave. · Consulte a reyna médico acerca de algún programa de ejercicios. El ejercicio le puede ayudar a aliviar la depresión leve. · Vaya al cine o a un concierto. Participe de [facility] de la [facility] o [facility]. Donold Noss a alvaro a un partido de fútbol. · Pídale a un amigo que cene con usted. Cuídese  · Siga konstantin dieta equilibrada con muchas frutas y verduras frescas, granos integrales y ventura magras de proteínas. Si perdió el apetito, coma pequeños refrigerios en lugar de comidas abundantes. · Evite beber alcohol o usar drogas ilegales. No tome medicamentos que no le hayan recetado a usted. Estos podrían interferir con los medicamentos que pudiera estar tomando para la depresión, o podrían empeorar la depresión. · [employer] medicamentos emily wilner le fueron recetados. Usted podría empezar a sentirse mejor entre 1 y 3 semanas de estar tomando los antidepresivos. Sheldon puede necesitar hasta 6 u 8 semanas para alvaro más mejoría. Hable con reyna médico si tiene preguntas o inquietudes acerca de hailee medicamentos, o si no observa ninguna mejoría en 3 semanas. · Si el [facility] produce algún [employer], avísele a reyna médico. Los antidepresivos pueden provocarle cansancio, mareos o nerviosismo. A algunas personas les produce sequedad en la boca, estreñimiento, anshul de nova, problemas sexuales o diarrea. Muchos de [employer] secundarios son leves y desaparecen por sí solos después de ángela el medicamento ana varias semanas. Otros podrían durar [employer]. Consulte a reyna médico si los efectos secundarios le [employer]. Es posible que pueda probar otro medicamento. · Duerma lo suficiente. Si no puede dormir:  ¨ Acuéstese a la misma hora todas las noches y levántese a la misma hora todas las Baker.   ¨ Mantenga reyna dormitorio oscuro y silencioso. ¨ No carlito ejercicio después de las 5:00 p.m.  ¨ Evite las bebidas con cafeína después de las 5:00 p.m.  · Evite las pastillas para dormir a menos que hayan sido recetadas por el médico que está tratando reyna depresión. Las pastillas para dormir podrían hacerlo sentir aturdido ana el día e interactuar con otros medicamentos que tome. · Si tiene alguna otra enfermedad, wilner diabetes, enfermedad del corazón o presión arterial gus, asegúrese de continuar con reyna tratamiento. Hable con reyna médico acerca de todos los medicamentos que lacho, incluyendo aquellos con o sin receta. · Guarde los números de estas líneas directas nacionales de prevención del suicidio: [phone]-JRYO ([phone]) y [phone]-IRNSYUF ([phone]). Si usted o alguien que usted conoce habla sobre el suicidio o acerca de sentirse desesperanzado, consiga ayuda de inmediato. ¿Cuándo debe pedir ayuda? Llame al 911 en cualquier momento que considere que necesita atención de [place]. Por ejemplo, llame si:  · Siente ganas de lastimarse a sí mismo o a otra persona. · Alguien que conoce está deprimido y está intentando suicidarse o está a punto de hacerlo. Llame a reyna médico ahora mismo o busque atención médica inmediata si:  · Oye voces. · Alguien que usted conoce está deprimido y:  Tj Castillo a regalar hailee posesiones. ¨ Usa drogas ilegales o elizabeth alcohol en exceso. ¨ Habla o escribe acerca de la muerte, lo que incluye notas de suicidio o [address] rusty de mckayla, cuchillos o pastillas. ¨ Empieza a pasar mucho tiempo a solas. ¨ Actúa de manera muy agresiva o parece calmado de repente. Preste especial atención a los cambios en reyna velvet y asegúrese de comunicarse con reyna médico si:  · No mejora wilner se esperaba. ¿Dónde puede encontrar más información en inglés? Keri Hanley a [email or web address]   Pollo Hart R006 en la búsqueda para aprender más acerca de \"Recuperación de la depresión: Instrucciones de cuidado - [ Elvera [place] From Depression: Care Instructions ]. \"  Revisado: 26 julio, 2016  Versión del contenido: 11.3  © 4457-2389 Healthwise, Incorporated. Las instrucciones de cuidado fueron adaptadas bajo licencia por Good Help Connections (which disclaims liability or warranty for this information). Si usted tiene [place] afección médica o sobre estas instrucciones, siempre pregunte a reyna profesional de velvet. [employer] niega toda garantía o responsabilidad por reyna uso de esta información.

## 2017-10-23 NOTE — PROGRESS NOTES
Assessment/Plan:    Diagnoses and all orders for this visit:    1. Well woman exam  -     PAP, LB, RFX HPV BUZZD([id number])    2. Lack of libido  -     REFERRAL TO BEHAVIORAL HEALTH    3. Other depression  -     REFERRAL TO BEHAVIORAL HEALTH    Refer to Hans Windy, she is a [address] speaker  F/up as needed    Follow-up Disposition:  Return in about 4 weeks (around 2017). DARRELL Johnson Shoulders expressed understanding of this plan. An AVS was printed and given to the patient.      ----------------------------------------------------------------------    Chief Complaint   Patient presents with    Well Woman     Well woman exam       History of Present Illness:   1 , one . Had BTL after second delivery  Has normal monthly periods  Has no pain with intercourse but in the past 6 months has lost her desire to have sex with her    and no current DV. There had been DV bc her  had been drinking heavily- he quit 2 years ago when she called the police on him. He has been doing AA and ETOH classes and has been a changed man. She states that she has lost her desire to do activities out of the house. She has lost interest in activities that she used to love. She has lost desire for sex       No past medical history on file. Current Outpatient Prescriptions   Medication Sig Dispense Refill    butalbital-acetaminophen-caff (FIORICET) -40 mg per capsule Take 1 Cap by mouth every four (4) hours as needed for Pain. Max Daily Amount: 6 Caps. 15 Cap 1    omeprazole (PRILOSEC) 20 mg capsule Take 1 Cap by mouth daily. 20 Cap 0       Allergies   Allergen Reactions    Dilaudid [Hydromorphone (Bulk)] Itching    Other Medication Swelling     Pt c/o of swelling in body after pain medication administered to her after surgical procedure,  Pt does not know what medication it was.     Vancomycin Hives       Social History   Substance Use Topics    Smoking status: Never Smoker    Smokeless tobacco: Never Used    Alcohol use No       No family history on file. Physical Exam:     Visit Vitals    /74 (BP 1 Location: Left arm, BP Patient Position: Sitting)    Pulse 79    Temp 98.9 °F (37.2 °C) (Oral)    Wt 261 lb (118.4 kg)    LMP 10/02/2017    BMI 43.43 kg/m2       A&Ox3  WDWN NAD  Respirations normal and non labored  Pelvic exam- ext neg for lesions or discharge. Cervix w/out any lesions or discharge.  Uterus and adnexal exam difficult to assess due to abdominal panus but not tender

## 2017-11-21 ENCOUNTER — DOCUMENTATION ONLY (OUTPATIENT)
Dept: FAMILY MEDICINE CLINIC | Age: 32
End: 2017-11-21

## 2018-11-09 ENCOUNTER — APPOINTMENT (OUTPATIENT)
Dept: GENERAL RADIOLOGY | Age: 33
End: 2018-11-09
Attending: EMERGENCY MEDICINE
Payer: SELF-PAY

## 2018-11-09 ENCOUNTER — HOSPITAL ENCOUNTER (EMERGENCY)
Age: 33
Discharge: HOME | End: 2018-11-10
Attending: EMERGENCY MEDICINE
Payer: SELF-PAY

## 2018-11-09 DIAGNOSIS — R50.9 ACUTE FEBRILE ILLNESS: Primary | ICD-10-CM

## 2018-11-09 DIAGNOSIS — N83.201 RIGHT OVARIAN CYST: ICD-10-CM

## 2018-11-09 LAB
ALBUMIN SERPL-MCNC: 3.4 G/DL (ref 3.5–5)
ALBUMIN/GLOB SERPL: 0.8 {RATIO} (ref 1.1–2.2)
ALP SERPL-CCNC: 73 U/L (ref 45–117)
ALT SERPL-CCNC: 134 U/L (ref 12–78)
ANION GAP SERPL CALC-SCNC: 8 MMOL/L (ref 5–15)
APPEARANCE UR: CLEAR
AST SERPL-CCNC: 75 U/L (ref 15–37)
BACTERIA URNS QL MICRO: NEGATIVE /HPF
BASOPHILS # BLD: 0 K/UL (ref 0–0.1)
BASOPHILS NFR BLD: 0 % (ref 0–1)
BILIRUB SERPL-MCNC: 0.5 MG/DL (ref 0.2–1)
BILIRUB UR QL: NEGATIVE
BUN SERPL-MCNC: 10 MG/DL (ref 6–20)
BUN/CREAT SERPL: 13 (ref 12–20)
CALCIUM SERPL-MCNC: 9 MG/DL (ref 8.5–10.1)
CHLORIDE SERPL-SCNC: 106 MMOL/L (ref 97–108)
CO2 SERPL-SCNC: 21 MMOL/L (ref 21–32)
COLOR UR: ABNORMAL
CREAT SERPL-MCNC: 0.77 MG/DL (ref 0.55–1.02)
DIFFERENTIAL METHOD BLD: ABNORMAL
EOSINOPHIL # BLD: 0.1 K/UL (ref 0–0.4)
EOSINOPHIL NFR BLD: 1 % (ref 0–7)
EPITH CASTS URNS QL MICRO: ABNORMAL /LPF
ERYTHROCYTE [DISTWIDTH] IN BLOOD BY AUTOMATED COUNT: 13.6 % (ref 11.5–14.5)
GLOBULIN SER CALC-MCNC: 4.5 G/DL (ref 2–4)
GLUCOSE SERPL-MCNC: 98 MG/DL (ref 65–100)
GLUCOSE UR STRIP.AUTO-MCNC: NEGATIVE MG/DL
HCG UR QL: NEGATIVE
HCT VFR BLD AUTO: 38.8 % (ref 35–47)
HGB BLD-MCNC: 12.3 G/DL (ref 11.5–16)
HGB UR QL STRIP: ABNORMAL
HYALINE CASTS URNS QL MICRO: ABNORMAL /LPF (ref 0–5)
IMM GRANULOCYTES # BLD: 0.1 K/UL (ref 0–0.04)
IMM GRANULOCYTES NFR BLD AUTO: 0 % (ref 0–0.5)
KETONES UR QL STRIP.AUTO: NEGATIVE MG/DL
LACTATE BLD-SCNC: 1.92 MMOL/L (ref 0.4–2)
LEUKOCYTE ESTERASE UR QL STRIP.AUTO: NEGATIVE
LYMPHOCYTES # BLD: 1.2 K/UL (ref 0.8–3.5)
LYMPHOCYTES NFR BLD: 10 % (ref 12–49)
MCH RBC QN AUTO: 26.9 PG (ref 26–34)
MCHC RBC AUTO-ENTMCNC: 31.7 G/DL (ref 30–36.5)
MCV RBC AUTO: 84.9 FL (ref 80–99)
MONOCYTES # BLD: 0.8 K/UL (ref 0–1)
MONOCYTES NFR BLD: 7 % (ref 5–13)
NEUTS SEG # BLD: 9.3 K/UL (ref 1.8–8)
NEUTS SEG NFR BLD: 81 % (ref 32–75)
NITRITE UR QL STRIP.AUTO: NEGATIVE
NRBC # BLD: 0 K/UL (ref 0–0.01)
NRBC BLD-RTO: 0 PER 100 WBC
PH UR STRIP: 6.5 [PH] (ref 5–8)
PLATELET # BLD AUTO: 283 K/UL (ref 150–400)
PMV BLD AUTO: 10.3 FL (ref 8.9–12.9)
POTASSIUM SERPL-SCNC: 3.7 MMOL/L (ref 3.5–5.1)
PROT SERPL-MCNC: 7.9 G/DL (ref 6.4–8.2)
PROT UR STRIP-MCNC: NEGATIVE MG/DL
RBC # BLD AUTO: 4.57 M/UL (ref 3.8–5.2)
RBC #/AREA URNS HPF: ABNORMAL /HPF (ref 0–5)
SODIUM SERPL-SCNC: 135 MMOL/L (ref 136–145)
SP GR UR REFRACTOMETRY: 1.02 (ref 1–1.03)
UR CULT HOLD, URHOLD: NORMAL
UROBILINOGEN UR QL STRIP.AUTO: 1 EU/DL (ref 0.2–1)
WBC # BLD AUTO: 11.5 K/UL (ref 3.6–11)
WBC URNS QL MICRO: ABNORMAL /HPF (ref 0–4)

## 2018-11-09 PROCEDURE — 74011250636 HC RX REV CODE- 250/636: Performed by: EMERGENCY MEDICINE

## 2018-11-09 PROCEDURE — 99284 EMERGENCY DEPT VISIT MOD MDM: CPT

## 2018-11-09 PROCEDURE — 85025 COMPLETE CBC W/AUTO DIFF WBC: CPT

## 2018-11-09 PROCEDURE — 71046 X-RAY EXAM CHEST 2 VIEWS: CPT

## 2018-11-09 PROCEDURE — 96374 THER/PROPH/DIAG INJ IV PUSH: CPT

## 2018-11-09 PROCEDURE — 81001 URINALYSIS AUTO W/SCOPE: CPT

## 2018-11-09 PROCEDURE — 96375 TX/PRO/DX INJ NEW DRUG ADDON: CPT

## 2018-11-09 PROCEDURE — 81025 URINE PREGNANCY TEST: CPT

## 2018-11-09 PROCEDURE — 87804 INFLUENZA ASSAY W/OPTIC: CPT

## 2018-11-09 PROCEDURE — 96361 HYDRATE IV INFUSION ADD-ON: CPT

## 2018-11-09 PROCEDURE — 87040 BLOOD CULTURE FOR BACTERIA: CPT

## 2018-11-09 PROCEDURE — 80053 COMPREHEN METABOLIC PANEL: CPT

## 2018-11-09 PROCEDURE — 93005 ELECTROCARDIOGRAM TRACING: CPT

## 2018-11-09 PROCEDURE — 74011250637 HC RX REV CODE- 250/637: Performed by: EMERGENCY MEDICINE

## 2018-11-09 PROCEDURE — 83605 ASSAY OF LACTIC ACID: CPT

## 2018-11-09 PROCEDURE — 36415 COLL VENOUS BLD VENIPUNCTURE: CPT

## 2018-11-09 RX ORDER — ACETAMINOPHEN 500 MG
1000 TABLET ORAL
Status: COMPLETED | OUTPATIENT
Start: 2018-11-09 | End: 2018-11-09

## 2018-11-09 RX ORDER — KETOROLAC TROMETHAMINE 30 MG/ML
30 INJECTION, SOLUTION INTRAMUSCULAR; INTRAVENOUS
Status: COMPLETED | OUTPATIENT
Start: 2018-11-09 | End: 2018-11-09

## 2018-11-09 RX ORDER — ONDANSETRON 2 MG/ML
8 INJECTION INTRAMUSCULAR; INTRAVENOUS
Status: COMPLETED | OUTPATIENT
Start: 2018-11-09 | End: 2018-11-09

## 2018-11-09 RX ADMIN — KETOROLAC TROMETHAMINE 30 MG: 30 INJECTION, SOLUTION INTRAMUSCULAR at 23:44

## 2018-11-09 RX ADMIN — SODIUM CHLORIDE 2000 ML: 900 INJECTION, SOLUTION INTRAVENOUS at 23:48

## 2018-11-09 RX ADMIN — ONDANSETRON 8 MG: 2 INJECTION INTRAMUSCULAR; INTRAVENOUS at 23:44

## 2018-11-09 RX ADMIN — ACETAMINOPHEN 1000 MG: 500 TABLET ORAL at 23:44

## 2018-11-10 ENCOUNTER — APPOINTMENT (OUTPATIENT)
Dept: CT IMAGING | Age: 33
End: 2018-11-10
Attending: EMERGENCY MEDICINE
Payer: SELF-PAY

## 2018-11-10 ENCOUNTER — APPOINTMENT (OUTPATIENT)
Dept: ULTRASOUND IMAGING | Age: 33
End: 2018-11-10
Attending: EMERGENCY MEDICINE
Payer: SELF-PAY

## 2018-11-10 VITALS
HEART RATE: 94 BPM | OXYGEN SATURATION: 97 % | TEMPERATURE: 100.2 F | DIASTOLIC BLOOD PRESSURE: 74 MMHG | SYSTOLIC BLOOD PRESSURE: 132 MMHG | RESPIRATION RATE: 16 BRPM

## 2018-11-10 VITALS
WEIGHT: 260 LBS | SYSTOLIC BLOOD PRESSURE: 137 MMHG | RESPIRATION RATE: 22 BRPM | TEMPERATURE: 99.5 F | BODY MASS INDEX: 40.81 KG/M2 | HEIGHT: 67 IN | DIASTOLIC BLOOD PRESSURE: 64 MMHG | OXYGEN SATURATION: 96 % | HEART RATE: 135 BPM

## 2018-11-10 DIAGNOSIS — N83.201 RIGHT OVARIAN CYST: Primary | ICD-10-CM

## 2018-11-10 DIAGNOSIS — G44.209 TENSION HEADACHE: ICD-10-CM

## 2018-11-10 LAB
CRP SERPL-MCNC: 2.91 MG/DL (ref 0–0.6)
FLUAV AG NPH QL IA: NEGATIVE
FLUBV AG NOSE QL IA: NEGATIVE

## 2018-11-10 PROCEDURE — 99283 EMERGENCY DEPT VISIT LOW MDM: CPT

## 2018-11-10 PROCEDURE — 96361 HYDRATE IV INFUSION ADD-ON: CPT

## 2018-11-10 PROCEDURE — 74011000258 HC RX REV CODE- 258: Performed by: RADIOLOGY

## 2018-11-10 PROCEDURE — 96372 THER/PROPH/DIAG INJ SC/IM: CPT

## 2018-11-10 PROCEDURE — 76830 TRANSVAGINAL US NON-OB: CPT

## 2018-11-10 PROCEDURE — 76856 US EXAM PELVIC COMPLETE: CPT

## 2018-11-10 PROCEDURE — 74011250636 HC RX REV CODE- 250/636: Performed by: EMERGENCY MEDICINE

## 2018-11-10 PROCEDURE — 74011636320 HC RX REV CODE- 636/320: Performed by: RADIOLOGY

## 2018-11-10 PROCEDURE — 86140 C-REACTIVE PROTEIN: CPT

## 2018-11-10 PROCEDURE — 74177 CT ABD & PELVIS W/CONTRAST: CPT

## 2018-11-10 RX ORDER — IBUPROFEN 800 MG/1
800 TABLET ORAL
Qty: 30 TAB | Refills: 0 | Status: SHIPPED | OUTPATIENT
Start: 2018-11-10 | End: 2019-02-14 | Stop reason: SDUPTHER

## 2018-11-10 RX ORDER — ACETAMINOPHEN 500 MG
1000 TABLET ORAL
Qty: 30 TAB | Refills: 0 | Status: SHIPPED | OUTPATIENT
Start: 2018-11-10 | End: 2019-04-16

## 2018-11-10 RX ORDER — KETOROLAC TROMETHAMINE 30 MG/ML
30 INJECTION, SOLUTION INTRAMUSCULAR; INTRAVENOUS
Status: COMPLETED | OUTPATIENT
Start: 2018-11-10 | End: 2018-11-10

## 2018-11-10 RX ORDER — SODIUM CHLORIDE 0.9 % (FLUSH) 0.9 %
SYRINGE (ML) INJECTION
Status: DISCONTINUED
Start: 2018-11-10 | End: 2018-11-10 | Stop reason: HOSPADM

## 2018-11-10 RX ORDER — SODIUM CHLORIDE 0.9 % (FLUSH) 0.9 %
10 SYRINGE (ML) INJECTION
Status: COMPLETED | OUTPATIENT
Start: 2018-11-10 | End: 2018-11-10

## 2018-11-10 RX ORDER — SODIUM CHLORIDE 9 MG/ML
INJECTION, SOLUTION INTRAVENOUS
Status: DISCONTINUED
Start: 2018-11-10 | End: 2018-11-10 | Stop reason: HOSPADM

## 2018-11-10 RX ADMIN — IOPAMIDOL 100 ML: 755 INJECTION, SOLUTION INTRAVENOUS at 01:42

## 2018-11-10 RX ADMIN — SODIUM CHLORIDE 100 ML: 900 INJECTION, SOLUTION INTRAVENOUS at 01:16

## 2018-11-10 RX ADMIN — Medication 10 ML: at 01:42

## 2018-11-10 RX ADMIN — KETOROLAC TROMETHAMINE 30 MG: 30 INJECTION, SOLUTION INTRAMUSCULAR at 19:43

## 2018-11-10 NOTE — ED TRIAGE NOTES
Pt reports she was seen here yesterday for headache and right lower quad abd pain. Pt was diagnosed with febrile illness and right ovarian cyst.  Pt states her pain is worse today. Temp 100.1 in triage. Pt took Tylenol at 1530 amd Motrin at 1630.

## 2018-11-10 NOTE — ED TRIAGE NOTES
Pt arrives ambulatory from home c/o fever, 10/10 headache, body aches, and bone pain; states she woke up this morning with a 100.4 fever and took ibuprofen, went back to sleep and woke up again with a 104.9 temp; pt is Croatian speaking and requests language line; pt is accompanied by  and both speak some English; this RN is Croatian speaking so pt deferred language line at this time

## 2018-11-10 NOTE — DISCHARGE INSTRUCTIONS
Malou Cohen en el ovario: Instrucciones de cuidado - [ Functional Ovarian Cyst: Care Instructions ]  Instrucciones de cuidado    Un quiste funcional en el ovario es un saco que se forma en la superficie del ovario de la alan ana la ovulación. El saco contiene un óvulo en maduración. Por lo general, el saco desaparece después de que se libera el óvulo. Sheldon si el óvulo no se libera o si el saco se eliazar luego de [employer] [place], el saco puede llenarse de líquido y formar un quiste. Los quistes ováricos funcionales son diferentes a los crecimientos ováricos provocados por otros problemas, wilner el cáncer. La mayoría de los quistes ováricos funcionales no causan síntomas y desaparecen por sí mismos. Algunos causan dolor leve. Otros pueden causar dolor kieran cuando se rompen o sangran. La atención de seguimiento es konstantin parte clave de reyna tratamiento y seguridad. Asegúrese de hacer y acudir a todas las citas, y llame a reyna médico si está teniendo problemas. También es konstantin buena idea saber los resultados de hailee exámenes y mantener konstantin lista de los medicamentos que lacho. ¿Cómo puede cuidarse en el hogar? · Utilice calor, por ejemplo, de konstantin bolsa de [place], konstantin almohadilla térmica ajustada a temperatura baja o un baño tibio para relajar los músculos tensos y [employer]. · [employer] analgésicos (medicamentos para el dolor) exactamente según las indicaciones. ? Si el médico le recetó un analgésico, tómelo según las indicaciones. ? Si no está tomando un analgésico recetado, pregúntele a reyna médico si puede ángela yarelis de [employer]. · Evite el estreñimiento. Asegúrese de ángela suficiente cantidad de líquidos e incluya en reyna dieta diaria frutas, verduras y [place]. El estreñimiento no causa quistes ováricos, sheldon podría empeorar el dolor en la pelvis. ¿Cuándo debes pedir ayuda? [place] al 911 en cualquier momento que consideres que necesitas atención de [place].  Por ejemplo, llama si:    · Te desmayaste (perdiste el conocimiento).     · Tienes dolor repentino e intenso en el abdomen o la pelvis.    Llama a tu médico ahora mismo o busca atención médica inmediata si:    · Tienes un dolor nuevo en el abdomen o la pelvis, o tu dolor empeora.     · Tienes sangrado vaginal intenso. [place] significa que empapas las toallas sanitarias o los tampones que sueles usar cada hora, ana 2 o más horas.     · Te sientes mareada o aturdida, o sientes que te vas a desmayar.     · Tienes dolor o sangrado ana o después de las relaciones sexuales.    Presta especial atención a los cambios en tu velvet y asegúrate de comunicarte con tu médico si:    · Tu dolor no te jai hacer las cosas que disfrutas.     · No mejoras wilner se esperaba. ¿Dónde puede encontrar más información en inglés? Diegoa Silence a http://césar-clovis.info/. Santy Due C315 en la búsqueda para aprender más acerca de \"Quiste funcional en el ovario: Instrucciones de cuidado - [ Functional Ovarian Cyst: Care Instructions ]. \"  Revisado: 15 sellers, 2018  Versión del contenido: 11.8  © 20063718-7664 Healthwise, Incorporated. Las instrucciones de cuidado fueron adaptadas bajo licencia por [employer] (which disclaims liability or warranty for this information). Si usted tiene [place] afección médica o sobre estas instrucciones, siempre pregunte a reyna profesional de velvet. Healthwise, Incorporated niega toda garantía o responsabilidad por reyna uso de esta información. Aprenda sobre la fiebre - [ Learning About Fever ]  ¿Qué es la fiebre? La fiebre es konstantin temperatura corporal gus. Es konstantin de las [employer] que el cuerpo combate enfermedades. La fiebre indica que el cuerpo está reaccionando a konstantin infección o a otras enfermedades, tanto leves wilner graves. La fiebre es un síntoma, no konstantin enfermedad en sí misma.  La fiebre puede ser konstantin señal de que usted está enfermo, barry la mayoría de las fiebres no están causadas por un problema grave. Es posible que usted tenga fiebre con konstantin enfermedad de georgiana importancia, wilner un resfriado. Sheldon, en ocasiones, konstantin infección muy grave puede causar poca o nada de fiebre. Es importante considerar otros síntomas, otras afecciones que usted tenga y cómo se siente usted en general. En niños, preste atención a reyna comportamiento y a los síntomas de los que se [place]. ¿Cuál es la temperatura normal del cuerpo? Konstantin temperatura corporal normal es de 98.6°F (37°C), aproximadamente. Algunas personas tienen konstantin temperatura normal que es un poco más gus o algo más baja que esta. Reyna temperatura puede ser un poco más baja en la mañana que más tarde en el día. Podría elevarse cuando hace ejercicio, lleva ropa gruesa, lacho un baño caliente o cuando hace calor. Reyna temperatura también puede ser diferente dependiendo de cómo la mida. Si mide la temperatura en la boca (oral) o en la axila, puede ser algo más baja que la temperatura central (rectal). ¿Qué es konstantin temperatura de fiebre? Konstantin temperatura central de 100.4°F (38°C) o superior se considera fiebre. ¿Qué puede causar la fiebre? La fiebre puede ser causada por:  · Infecciones. Esta es la causa más común de la [place]. Los ejemplos de infecciones que pueden provocar fiebre incluyen la gripe, konstantin infección de los riñones o la neumonía. · Algunos medicamentos. · Traumatismos o lesiones graves, wilner un ataque al corazón, un ataque cerebral, insolación o quemaduras. · Otras afecciones médicas, wilner artritis y algunos tipos de cáncer. ¿Cómo puede tratar la fiebre en el hogar? · Pregúntele a reyna médico si puede ángela un analgésico (medicamento para el dolor) de venta tamanna, wilner acetaminofén (Tylenol), ibuprofeno (Advil, Motrin) o naproxeno (Aleve). Sea armida con los medicamentos. Carissa y siga todas las instrucciones de la [place]. · Jessica abundantes líquidos para prevenir la deshidratación.  Opte por beber agua y otros líquidos mojgan sin cafeína hasta que se sienta mejor. Si tiene konstantin enfermedad renal, cardíaca o hepática y tiene que restringir los líquidos, hable con reyna médico antes de aumentar la cantidad de líquido que elizabeth. La atención de seguimiento es konstantin parte clave de reyna tratamiento y seguridad. Asegúrese de hacer y acudir a todas las citas, y llame a reyna médico si está teniendo problemas. También es konstantin buena idea saber los resultados de hailee exámenes y mantener konstantin lista de los medicamentos que lacho. ¿Dónde puede encontrar más información en inglés? Juan Matthew a http://césar-clovis.info/. Noel Crooked H019 en la búsqueda para aprender más acerca de \"Aprenda sobre la Jermaine Ibanez - [ Learning About Fever ]. \"  Revisado: 20 noviembre, 2017  Versión del contenido: 11.8  © 9575-9290 Healthwise, Incorporated. Las instrucciones de cuidado fueron adaptadas bajo licencia por [employer] (which disclaims liability or warranty for this information). Si usted tiene [place] afección médica o sobre estas instrucciones, siempre pregunte a reyna profesional de velvet. Healthwise, Incorporated niega toda garantía o responsabilidad por reyna uso de esta información.

## 2018-11-10 NOTE — ED PROVIDER NOTES
[address] [place]. female with past medical history significant for cholecystectomy who presents from home with chief complaint of abdominal pain. Pt reports 1 day hx of generalized body aches and fever. She reports that she also developed severe right lower quadrant pain today accompanied by nausea and vomiting. The pain has been constant. Furthermore, pt c/o back pain, frontal headache and sore throat. Pt denies diarrhea, chest pain or shortness of breath. No hx of diabetes. There are no other acute medical concerns at this time. Social hx: Primarily Singaporean speaking PCP: None Note written by Shireen Linton, as dictated by Natividad Valencia MD 11:26 PM 
 
 
 
  
 
History reviewed. No pertinent past medical history. Past Surgical History:  
Procedure Laterality Date  HX GYN    
 HX LAP CHOLECYSTECTOMY History reviewed. No pertinent family history. Social History Socioeconomic History  Marital status: SINGLE Spouse name: Not on file  Number of children: Not on file  Years of education: Not on file  Highest education level: Not on file Social Needs  Financial resource strain: Not on file  Food insecurity - worry: Not on file  Food insecurity - inability: Not on file  Transportation needs - medical: Not on file  Transportation needs - non-medical: Not on file Occupational History  Not on file Tobacco Use  Smoking status: Never Smoker  Smokeless tobacco: Never Used Substance and Sexual Activity  Alcohol use: Yes Alcohol/week: 0.0 oz  
  Comment: socially  Drug use: No  
 Sexual activity: Not Currently Other Topics Concern  Not on file Social History Narrative ** Merged History Encounter **  
    
 ** Merged History Encounter ** ALLERGIES: Dilaudid [hydromorphone (bulk)]; Other medication; and Vancomycin Review of Systems Constitutional: Positive for fever. HENT: Positive for sore throat. Respiratory: Negative for shortness of breath. Cardiovascular: Negative for chest pain. Gastrointestinal: Positive for abdominal pain, nausea and vomiting. Negative for diarrhea. All other systems reviewed and are negative. Vitals:  
 11/09/18 2053 11/09/18 2155 BP:  (!) 132/98 Pulse: (!) 134 (!) 135 Resp:  22 Temp:  (!) 102.7 °F (39.3 °C) SpO2: 99% 99% Weight:  117.9 kg (260 lb) Height:  5' 7\" (1.702 m) Physical Exam  
Nursing note and vitals reviewed. CONSTITUTIONAL: Well-appearing; well-nourished; in no apparent distress HEAD: Normocephalic; atraumatic EYES: PERRL; EOM intact; conjunctiva and sclera are clear bilaterally. ENT: No rhinorrhea; normal pharynx with no tonsillar hypertrophy; mucous membranes pink/moist, no erythema, no exudate. NECK: Supple; non-tender; no cervical lymphadenopathy CARD: Normal S1, S2; no murmurs, rubs, or gallops. Regular rate and rhythm. RESP: Normal respiratory effort; breath sounds clear and equal bilaterally; no wheezes, rhonchi, or rales. ABD: Normal bowel sounds; non-distended; non-tender; no palpable organomegaly, no masses, no bruits. Back Exam: Normal inspection; no vertebral point tenderness, no CVA tenderness. Normal range of motion. EXT: Normal ROM in all four extremities; non-tender to palpation; no swelling or deformity; distal pulses are normal, no edema. SKIN: Warm; dry; no rash. NEURO:Alert and oriented x 3, coherent, HUY-XII grossly intact, sensory and motor are non-focal. 
 
 
 
MDM Number of Diagnoses or Management Options Acute febrile illness:  
Right ovarian cyst:  
Diagnosis management comments: Assessment: febrile illness in the right lower quadrant pain, rule out sepsis, dehydration, suspect viral illness. The patient is hemodynamically stable and well. Plan: EKG/ Chest x-ray/ lab/ CT scan of the abdomen and pelvis/ antipyretic and analgesia/ serial exam/ Monitor and Reevaluate. Amount and/or Complexity of Data Reviewed Clinical lab tests: ordered and reviewed Tests in the radiology section of CPT®: ordered and reviewed Tests in the medicine section of CPT®: reviewed and ordered Discussion of test results with the performing providers: yes Decide to obtain previous medical records or to obtain history from someone other than the patient: yes Obtain history from someone other than the patient: yes Review and summarize past medical records: yes Discuss the patient with other providers: yes Independent visualization of images, tracings, or specimens: yes Risk of Complications, Morbidity, and/or Mortality Presenting problems: moderate Diagnostic procedures: moderate Management options: moderate Patient Progress Patient progress: stable Procedures XRAY INTERPRETATION (ED MD) Chest Xray No acute process seen. Normal heart size. No bony abnormalities. No infiltrate. Mae Trent MD 11:26 PM 
 
Progress Note:  
Pt has been reexamined by Joseph Chauhan MD. Pt is feeling much better. Symptoms have improved. All available results have been reviewed with pt and any available family. Pt understands sx, dx, and tx in ED. Care plan has been outlined and questions have been answered. Pt is ready to go home. Will send home on Acute febrile illness and ovarian cyst and dehydration instruction. Outpatient referral with PCP as needed. Written by Joseph Chauhan MD,2:08 AM 
 
. Nesha Cardenas

## 2018-11-11 LAB
ATRIAL RATE: 122 BPM
CALCULATED P AXIS, ECG09: 51 DEGREES
CALCULATED R AXIS, ECG10: 16 DEGREES
CALCULATED T AXIS, ECG11: 34 DEGREES
DIAGNOSIS, 93000: NORMAL
P-R INTERVAL, ECG05: 136 MS
Q-T INTERVAL, ECG07: 318 MS
QRS DURATION, ECG06: 86 MS
QTC CALCULATION (BEZET), ECG08: 453 MS
VENTRICULAR RATE, ECG03: 122 BPM

## 2018-11-11 NOTE — ED PROVIDER NOTES
35 y.o. female with past medical history significant for right ovarian cyst presents with chief complaint of a fever and worsening RLQ abd pain, onset yesterday. Pt reports that she was seen here yesterday where she had CT abd w/ contrast that showed a 5.6 cm right ovarian cyst and an unremarkable appendix; she did not have an ultrasound. Pt reports associated headache. She also reports that her fever was measured to be 100.2 F at 3 PM at home, and then again at 103.7 F later on. She states that she has been alternating between Ibuprofen and Tylenol every 3 hours. Pt also states that she does not have an OB/GYN currently, but was given the phone number to one yesterday by Dr. Leticia Oakes. Pt indicates that she is currently on her menstrual cycle. Pt denies any nausea, vomiting, cough, shortness or breath, or urinary sx currently. There are no other acute medical concerns at this time. Social hx: Patient denies Tobacco use. Reports occasional EtOH use. Denies illicit drug abuse. PCP: None Note written by Isael Padilla, as dictated by Dallas Juárez MD 7:37 PM 
 
 
 
The history is provided by the patient. Headache Associated symptoms include a fever. Pertinent negatives include no shortness of breath, no nausea and no vomiting. Abdominal Pain Associated symptoms include a fever and headaches. Pertinent negatives include no nausea, no vomiting and no dysuria. No past medical history on file. Past Surgical History:  
Procedure Laterality Date  HX GYN    
 HX LAP CHOLECYSTECTOMY No family history on file. Social History Socioeconomic History  Marital status: SINGLE Spouse name: Not on file  Number of children: Not on file  Years of education: Not on file  Highest education level: Not on file Social Needs  Financial resource strain: Not on file  Food insecurity - worry: Not on file  Food insecurity - inability: Not on file  Transportation needs - medical: Not on file  Transportation needs - non-medical: Not on file Occupational History  Not on file Tobacco Use  Smoking status: Never Smoker  Smokeless tobacco: Never Used Substance and Sexual Activity  Alcohol use: Yes Alcohol/week: 0.0 oz  
  Comment: socially  Drug use: No  
 Sexual activity: Not Currently Other Topics Concern  Not on file Social History Narrative ** Merged History Encounter **  
    
 ** Merged History Encounter ** ALLERGIES: Dilaudid [hydromorphone (bulk)]; Other medication; and Vancomycin Review of Systems Constitutional: Positive for fever. Respiratory: Negative for cough and shortness of breath. Gastrointestinal: Positive for abdominal pain (RLQ). Negative for nausea and vomiting. Genitourinary: Negative for decreased urine volume, difficulty urinating, dysuria and urgency. Neurological: Positive for headaches. All other systems reviewed and are negative. Vitals:  
 11/10/18 1642 11/10/18 1856 11/10/18 2000 11/10/18 2100 BP:  147/69 121/61 132/74 Pulse: (!) 105 94 Resp:  16 Temp:  100.2 °F (37.9 °C) SpO2: 97% 98% 98% 97% Physical Exam  
Constitutional: She appears well-developed and well-nourished. No distress. HENT:  
Head: Normocephalic and atraumatic. Eyes: Conjunctivae are normal.  
Neck: Neck supple. Cardiovascular: Normal rate and regular rhythm. Pulmonary/Chest: Effort normal. No respiratory distress. Abdominal: She exhibits no distension. Pt has mild diffuse lower abd tenderness to palpation. Musculoskeletal: Normal range of motion. She exhibits no deformity. Neurological: She is alert. No cranial nerve deficit. Skin: Skin is warm and dry. Psychiatric: Her behavior is normal.  
Nursing note and vitals reviewed. Note written by Shieren Smith, as dictated by Dallas Juárez MD 7:37 PM  
 
MDM 35 y.o. female presents with lower abdominal pain on her period with known ovarian cuyst from recent CT. Has mild headache and low grade fever, suspect concomitant viral illness. Doppler of pelvis shows good flow to ovary without clinical signs of torsion and pain resolved with IM toradol. Discussed need for OP f/u with Gyn. Plan to follow up with PCP as needed and return precautions discussed for worsening or new concerning symptoms. Procedures

## 2018-11-15 LAB
BACTERIA SPEC CULT: NORMAL
SERVICE CMNT-IMP: NORMAL

## 2018-11-20 ENCOUNTER — OFFICE VISIT (OUTPATIENT)
Dept: FAMILY MEDICINE CLINIC | Age: 33
End: 2018-11-20

## 2018-11-20 VITALS
BODY MASS INDEX: 41.66 KG/M2 | HEART RATE: 82 BPM | SYSTOLIC BLOOD PRESSURE: 122 MMHG | TEMPERATURE: 98.7 F | WEIGHT: 266 LBS | DIASTOLIC BLOOD PRESSURE: 69 MMHG

## 2018-11-20 DIAGNOSIS — N83.201 CYST OF RIGHT OVARY: Primary | ICD-10-CM

## 2018-11-20 NOTE — PROGRESS NOTES
Coordination of Care  1. Have you been to the ER, urgent care clinic since your last visit? Hospitalized since your last visit? Yes When: 11/10/18 -  [facility]    2. Have you seen or consulted any other health care providers outside of the [address] [place] [address] since your last visit? Include any pap smears or colon screening. No    Does the patient need refills? NO    Learning Assessment Complete?  yes

## 2018-11-20 NOTE — PROGRESS NOTES
Assessment/Plan:    Diagnoses and all orders for this visit:    1. Cyst of right ovary  -     REFERRAL TO GYNECOLOGY        Follow-up Disposition:  Return in about 3 months (around 2/20/2019) for care a gracie . DARRELL Lipscomb expressed understanding of this plan. An AVS was printed and given to the patient.      ----------------------------------------------------------------------    Chief Complaint   Patient presents with   [address] follow up       History of Present Illness:    Pt went to ER a few weeks ago for pelvic pain. She had pelvic CT and pelvic non ob ultrasound which revealed 5 cm right ovarian cyst. Since that time, she has had less pain but has not been pain free. She has been taking ibuprofen once daily for the pain, which is a decrease in the frequency of the medication. She had her left ovary surgically removed for ? Reasons but she did not think it was for cyst. She had a BTL for birth control    Pap was done 2017 and is normal (results in her chart)  Heavy periods which cause an increase in her right sided ovarian pain   She has had a number of pelvic ultrasounds in the past which confirm right ovarian cyst and left ovary surgically absent (see below)      No past medical history on file. Current Outpatient Medications   Medication Sig Dispense Refill    ibuprofen (MOTRIN) 800 mg tablet Take 1 Tab by mouth every six (6) hours as needed for Pain. 30 Tab 0    acetaminophen (TYLENOL) 500 mg tablet Take 2 Tabs by mouth every six (6) hours as needed for Pain. 30 Tab 0    butalbital-acetaminophen-caff (FIORICET) -40 mg per capsule Take 1 Cap by mouth every four (4) hours as needed for Pain. Max Daily Amount: 6 Caps. 15 Cap 1    omeprazole (PRILOSEC) 20 mg capsule Take 1 Cap by mouth daily.  20 Cap 0       Allergies   Allergen Reactions    Dilaudid [Hydromorphone (Bulk)] Itching    Other Medication Swelling     Pt c/o of swelling in body after pain medication administered to her after surgical procedure,  Pt does not know what medication it was.  Vancomycin Hives       Social History     Tobacco Use    Smoking status: Never Smoker    Smokeless tobacco: Never Used   Substance Use Topics    Alcohol use: Yes     Alcohol/week: 0.0 oz     Comment: socially    Drug use: No       No family history on file. Physical Exam:     Visit Vitals  /69 (BP 1 Location: Left arm, BP Patient Position: Sitting)   Pulse 82   Temp 98.7 °F (37.1 °C) (Oral)   Wt 266 lb (120.7 kg)   LMP 11/09/2018   BMI 41.66 kg/m²       A&Ox3  WDWN NAD  Respirations normal and non labored    XR Results (maximum last 3): Results from [place] encounter on 11/09/18   XR CHEST PA LAT    Narrative Chest PA and lateral    History: Bodyaches    Comparison: 8/9/2013    Findings: The lungs are well expanded. No focal consolidation, pleural  effusion, or pneumothorax. The cardiomediastinal silhouette is unremarkable. The visualized osseous structures are unremarkable. Impression Impression:  No acute cardiopulmonary process. Results from [place] encounter on 08/09/13   XR ABD FLAT/ ERECT    Narrative **Final Report**       ICD Codes / Adm. Diagnosis: 780.62  789.00 / Postprocedural fever  Abdominal   pain, unspecified si  Examination:  CR ABDOMEN FAU  - [id number] - Aug 10 2013  5:17AM  Accession No:  [id number]  Reason:  chest pain, abdominal pain, fever post op      REPORT:  INDICATION:   Abdominal pain    COMPARISON: None    FINDINGS:    Supine and upright views of the abdomen demonstrate a nonobstructive bowel   gas pattern. There is no free air. No abnormal calcifications are   identified. The osseous structures are normal.        IMPRESSION:  No acute process.               Signing/Reading Doctor: Isa Barragan ([id number])    Approved: Isa Barragan ([id number])  Aug  9 2013 10:50PM                                 XR CHEST PA LAT    Narrative **Final Report**       ICD Codes / Adm. Diagnosis: 780.62  789.00 / Postprocedural fever  Abdominal   pain, unspecified si  Examination:  CR CHEST PA AND LATERAL  - [id number] - Aug 10 2013  5:17AM  Accession No:  [id number]  Reason:  chest pain, abdominal pain, fever post op      REPORT:  INDICATION:   Chest pain    COMPARISON: August 3, 2013    FINDINGS:    Frontal and lateral views of the chest demonstrate a normal   cardiomediastinal silhouette. The lungs are adequately expanded. There is   no edema, effusion, consolidation, or pneumothorax. The osseous structures   are unremarkable. IMPRESSION:  No acute process. Signing/Reading Doctor: Sukumar Chester ([id number])    Approved: Sukumar Chester ([id number])  Aug  9 2013 10:50PM                                     CT Results (maximum last 3): Results from [place] encounter on 11/09/18   CT ABD PELV W CONT    Narrative EXAM:  CT ABD PELV W CONT    INDICATION: abd pain/fever    COMPARISON: 9/11/2017    CONTRAST:  100 mL of Isovue-370. TECHNIQUE:   Following the uneventful intravenous administration of contrast, thin axial  images were obtained through the abdomen and pelvis. Coronal and sagittal  reconstructions were generated. Oral contrast was not administered. CT dose  reduction was achieved through use of a standardized protocol tailored for this  examination and automatic exposure control for dose modulation. FINDINGS:   LUNG BASES: Clear. INCIDENTALLY IMAGED HEART AND MEDIASTINUM: Unremarkable. LIVER: No mass or biliary dilatation. GALLBLADDER: Status post cholecystectomy  SPLEEN: No mass. PANCREAS: No mass or ductal dilatation. ADRENALS: Unremarkable. KIDNEYS: No mass, calculus, or hydronephrosis. STOMACH: Unremarkable. SMALL BOWEL: No dilatation or wall thickening. COLON: No dilatation or wall thickening. APPENDIX: Unremarkable. PERITONEUM: No ascites or pneumoperitoneum.   RETROPERITONEUM: No lymphadenopathy or aortic aneurysm. REPRODUCTIVE ORGANS: The uterus is unremarkable. A tampon is in place. There is  a 5.6 cm right ovarian cyst.  URINARY BLADDER: No mass or calculus. BONES: No destructive bone lesion. ADDITIONAL COMMENTS: N/A      Impression IMPRESSION:  5.6 cm right ovarian cyst     Results from [place] encounter on 09/11/17   CT ABD PELV W CONT    Narrative EXAM:  CT ABD PELV W CONT    INDICATION: Abdominal pain. Diarrhea    COMPARISON: 4/30/2017    CONTRAST:  100 mL of Isovue-370. TECHNIQUE:   Following the uneventful intravenous administration of contrast, thin axial  images were obtained through the abdomen and pelvis. Coronal and sagittal  reconstructions were generated. Oral contrast was not administered. CT dose  reduction was achieved through use of a standardized protocol tailored for this  examination and automatic exposure control for dose modulation. FINDINGS:   LUNG BASES: Clear. INCIDENTALLY IMAGED HEART AND MEDIASTINUM: Unremarkable. LIVER: No mass or biliary dilatation. GALLBLADDER: Status post cholecystectomy. SPLEEN: No mass. PANCREAS: No mass or ductal dilatation. ADRENALS: Unremarkable. KIDNEYS: No mass, calculus, or hydronephrosis. STOMACH: Unremarkable. SMALL BOWEL: No dilatation or wall thickening. COLON: No dilatation or wall thickening. APPENDIX: Unremarkable. PERITONEUM: No ascites or pneumoperitoneum. RETROPERITONEUM: No lymphadenopathy or aortic aneurysm. REPRODUCTIVE ORGANS: The uterus and ovaries are unremarkable. URINARY BLADDER: No mass or calculus. BONES: No destructive bone lesion. ADDITIONAL COMMENTS: N/A      Impression IMPRESSION:  No evidence of acute process. Results from [place] encounter on 04/30/17   CT ABD PELV W CONT    Narrative INDICATION: RLQ pain/torsed R fallopian tube resection /still has appendix and  ovary I. Right lower quadrant abdominal pain that began at 0400 hours this  morning, described as stabbing and constant. Currently on menstrual cycle. Reports similar abdominal pain with past menstrual cycles although pain today is  worse than in past.     COMPARISON: CT February 23, 2016    TECHNIQUE:   Following the uneventful intravenous administration of 100 cc Isovue-370, thin  axial images were obtained through the abdomen and pelvis. Coronal and sagittal  reconstructions were generated. Oral contrast was not, per order of the ordering  physician, administered. CT dose reduction was achieved through use of a  standardized protocol tailored for this examination and automatic exposure  control for dose modulation. Adaptive statistical iterative reconstruction  (ASIR) was utilized. The lack of oral contrast material substantially decreases the capacity of CT to  determine abnormalities affecting the bowel. FINDINGS:   LUNG BASES: Clear. INCIDENTALLY IMAGED HEART AND MEDIASTINUM: Unremarkable. LIVER: Mild diffuse steatosis. No mass. GALLBLADDER: Status post cholecystectomy. No biliary ductal dilation. SPLEEN: No mass. PANCREAS: No mass or ductal dilatation. ADRENALS: Unremarkable. KIDNEYS: No mass, calculus, or hydronephrosis. STOMACH: Nondistended. SMALL BOWEL: Nondistended. COLON: Nondistended. APPENDIX: Normal caliber. PERITONEUM: No ascites or pneumoperitoneum. RETROPERITONEUM: No lymphadenopathy or aortic aneurysm. REPRODUCTIVE ORGANS: Vaginal tampon present. No mass, cyst or collection  demonstrated. Previously noted 7.6 cm pelvic cystic lesion no longer shown. URINARY BLADDER: No mass or calculus. BONES: Normal.  ADDITIONAL COMMENTS: N/A      Impression IMPRESSION:  No acute findings. MRI Results (maximum last 3): No results found for this or any previous visit. Nuclear Medicine Results (maximum last 3): No results found for this or any previous visit. US Results (maximum last 3):   Results from [place] encounter on 11/10/18   US TRANSVAGINAL    Narrative INDICATION:  r/o torsion on right. . 5 cm right ovarian cyst shown on previous  studies, most recently earlier same day, now with increasing pain and fever. EXAM: PELVIC AND TRANSVAGINAL ULTRASONOGRAPHY. COMPARISON: None . PROCEDURE: The pelvis was scanned via high resolution real-time linear array  sonography, using both the transabdominal and transvaginal approach, which was  required for delineation of the endometrium and ovaries . FINDINGS TRANSABDOMINAL:  The UTERUS MEASURES 9.4 x 5.1 x 4.7 cm. The central  endometrium measures 3mm in thickness. There is no focal endometrial mass or  fluid collection. There is no free fluid in the cul-de-sac. The RIGHT OVARY measures 7.7 x 5.3 x 5.1cm . The right ovary contains a dominant  hypoechoic cyst appearing lesion 5.2 x 5.1 x 4.9 cm. Flow is shown by color  Doppler. The LEFT OVARY cannot be seen because of overlying bowel gas obscuring  the adnexal regions. FINDINGS TRANSVAGINAL:  The uterus shows overall size of 8.6 x 6.1 x 4.6 cm,  with the central endometrium 4 mm. No endometrial mass or fluid collection. Nabothian cysts incidentally noted. Further evaluation of the ovaries transvaginally reveals the right ovary  measures 6.3 x 5.1 x 4.9 cm with a dominant cyst 4.5 x 4.5 x 4.3 cm. Blood flow  is shown by color Doppler in the right ovarian tissue. The left ovary is not  seen. .       Impression IMPRESSION:   1. Transabdominal pelvic ultrasound revealing unremarkable uterus. Dominant  right ovarian cyst, with blood flow by color Doppler. Left ovary not seen. .  2. Transvaginal pelvic ultrasound revealing unremarkable uterus with nabothian  cysts. Dominant right ovarian cyst with normal blood flow shown in the right  ovary, which does not entirely exclude torsion. Left ovary not seen. [facility] US PELV NON OBS    Narrative INDICATION:  r/o torsion on right. . 5 cm right ovarian cyst shown on previous  studies, most recently earlier same day, now with increasing pain and fever. EXAM: PELVIC AND TRANSVAGINAL ULTRASONOGRAPHY. COMPARISON: None . PROCEDURE: The pelvis was scanned via high resolution real-time linear array  sonography, using both the transabdominal and transvaginal approach, which was  required for delineation of the endometrium and ovaries . FINDINGS TRANSABDOMINAL:  The UTERUS MEASURES 9.4 x 5.1 x 4.7 cm. The central  endometrium measures 3mm in thickness. There is no focal endometrial mass or  fluid collection. There is no free fluid in the cul-de-sac. The RIGHT OVARY measures 7.7 x 5.3 x 5.1cm . The right ovary contains a dominant  hypoechoic cyst appearing lesion 5.2 x 5.1 x 4.9 cm. Flow is shown by color  Doppler. The LEFT OVARY cannot be seen because of overlying bowel gas obscuring  the adnexal regions. FINDINGS TRANSVAGINAL:  The uterus shows overall size of 8.6 x 6.1 x 4.6 cm,  with the central endometrium 4 mm. No endometrial mass or fluid collection. Nabothian cysts incidentally noted. Further evaluation of the ovaries transvaginally reveals the right ovary  measures 6.3 x 5.1 x 4.9 cm with a dominant cyst 4.5 x 4.5 x 4.3 cm. Blood flow  is shown by color Doppler in the right ovarian tissue. The left ovary is not  seen. .       Impression IMPRESSION:   1. Transabdominal pelvic ultrasound revealing unremarkable uterus. Dominant  right ovarian cyst, with blood flow by color Doppler. Left ovary not seen. .  2. Transvaginal pelvic ultrasound revealing unremarkable uterus with nabothian  cysts. Dominant right ovarian cyst with normal blood flow shown in the right  ovary, which does not entirely exclude torsion. Left ovary not seen. [facility] Results from [place] encounter on 02/23/16   US TRANSVAGINAL    Narrative **Final Report**       ICD Codes / Adm. Diagnosis: 812292  789.00 / Abdominal Pain  abominal pain  Examination:  US TRANSVAGINAL NON OB  - [id number] - Feb 23 2016  3:03PM  Accession No:  [id number]  Reason:  R adnexal findings on CT      REPORT:  EXAM:  US PELVIC NON OB, US TRANSVAGINAL NON OB    INDICATION:  Lower abdominal and pelvic inflammatory changes on CT. Pain. COMPARISON:  CT 2/23/2016    TECHNIQUE: Transabdominal and transvaginal ultrasound of the female pelvis. FINDINGS: Transabdominal ultrasound:  Urinary bladder: within normal limits. Uterus: measures 6.5 x 3.7 x 4.9 cm, which is within normal limits. There is   no sonographic myometrial abnormality. Endometrium: Obscured by distance from skin and bowel gas. The ovaries are not well seen due to body habitus. Free fluid: None. Endovaginal ultrasound:   Uterus: measures 9.1 x 6.2 x 4.8 cm, which is within normal limits. There is   no sonographic myometrial abnormality. Endometrium: 12 mm in thickness. Homogeneous. Right ovary: 5.2 x 3.5 x 4.0 cm. Blood flow is present in the right ovary. A   cystic lesion in the midline pelvis arising from the right adnexa measures   5.0 x 5.3 x 8.3 cm. Left ovary: 6.5 x 5.2 x 6.6 cm. Blood flow is present in the left ovary. There is no solid or cystic mass. Free fluid: Small amount of free fluid. IMPRESSION:     1. The right and left ovary demonstrate normal flow. There is a cyst or   cystic lesion arising from the right adnexa extending to the midline   measuring 5.0 x 5.3 x 8.3 cm. Further evaluation with nonemergent MRI or   surgical consultation is suggested. 2. Normal appearance of the uterus. The findings were discussed with Dr. Brendan Varghese on 2/23/2016 at 1525 hours by   Dr. José Richardson. [address]              Signing/Reading Doctor: Clearance Hima ([id number])    ApprovedArgie Denver ([id number])  Feb 23 2016  3:28PM                                      DEXA Results (maximum last 3): No results found for this or any previous visit. NASIR Results (maximum last 3): No results found for this or any previous visit. IR Results (maximum last 3):   No results found for this or any previous visit.    VAS/US Results (maximum last 3): No results found for this or any previous visit. PET Results (maximum last 3): No results found for this or any previous visit.

## 2019-02-14 ENCOUNTER — OFFICE VISIT (OUTPATIENT)
Dept: FAMILY MEDICINE CLINIC | Age: 34
End: 2019-02-14

## 2019-02-14 VITALS
SYSTOLIC BLOOD PRESSURE: 138 MMHG | DIASTOLIC BLOOD PRESSURE: 80 MMHG | WEIGHT: 256.4 LBS | TEMPERATURE: 98.4 F | BODY MASS INDEX: 42.72 KG/M2 | HEIGHT: 65 IN | HEART RATE: 70 BPM

## 2019-02-14 DIAGNOSIS — N83.201 CYST OF RIGHT OVARY: Primary | ICD-10-CM

## 2019-02-14 RX ORDER — IBUPROFEN 800 MG/1
800 TABLET ORAL
Qty: 60 TAB | Refills: 0 | Status: SHIPPED | OUTPATIENT
Start: 2019-02-14 | End: 2019-04-16

## 2019-02-14 NOTE — PROGRESS NOTES
Assessment/Plan:       ICD-10-CM ICD-9-CM    1. Cyst of right ovary N83.201 620.2 ibuprofen (MOTRIN) 800 mg tablet      US TRANSVAGINAL      REFERRAL TO GYNECOLOGY      US PELV NON OBS     Follow-up Disposition:  Return if symptoms worsen or fail to improve. [address] [place] [address], [address] [place] [address]  [address] [id number]  Phone: [phone] Fax: [phone]      [address]  Subjective:     Chief Complaint   Patient presents with    Other     hx of pain in right ovary.  Medication Refill    Andrew Robles is a 35 y.o. PATIENT REFUSED female. HPI: needs ibuprofen for pain in ovary. Doesn't have the left ovary. R ovary that hurts. 3 days ago. Constant, pressure. November found a cyst.  She saw Shelby Morejon, lost the appointment with the GYN. Related to the closure of the clinic. Needs appt. With [place]. 5 cm cyst in November. She  has no past medical history on file. Review of Systems: Negative for: fever, chest pain, shortness of breath, leg swelling, exertional dyspnea, palpitations. Current Medications:   Current Outpatient Medications on File Prior to Visit   Medication Sig    acetaminophen (TYLENOL) 500 mg tablet Take 2 Tabs by mouth every six (6) hours as needed for Pain.  butalbital-acetaminophen-caff (FIORICET) -40 mg per capsule Take 1 Cap by mouth every four (4) hours as needed for Pain. Max Daily Amount: 6 Caps.  omeprazole (PRILOSEC) 20 mg capsule Take 1 Cap by mouth daily. No current facility-administered medications on file prior to visit. Social History: She  reports that  has never smoked. she has never used smokeless tobacco. She reports that she drinks alcohol. She reports that she does not use drugs.   Objective:     Vitals:    02/14/19 1111 02/14/19 1114   BP: (!) 150/98 138/80   Pulse: 65 70   Temp: 98.4 °F (36.9 °C)    TempSrc: Oral    Weight: 256 lb 6.4 oz (116.3 kg)    Height: 5' 5.16\" (1.655 m)     Patient's last menstrual period was 02/14/2019. Wt Readings from Last 2 Encounters:   02/14/19 256 lb 6.4 oz (116.3 kg)   11/20/18 266 lb (120.7 kg)     No results found for any visits on 02/14/19. Physical Examination:  General appearance - well developed, no acute distress. Chest - clear to auscultation. Heart - regular rate and rhythm without murmurs, rubs, or gallops. Abdomen - bowel sounds present x 4, Guarding, no rebound, no peritonitis, mild tenderness to palp. ND. Extremities - pulses intact. No peripheral edema. Assessment/Plan:   Diagnoses and all orders for this visit:    1. Cyst of right ovary  -     ibuprofen (MOTRIN) 800 mg tablet; Take 1 Tab by mouth every six (6) hours as needed for Pain. Latvian sig  -     US TRANSVAGINAL; Future  -     REFERRAL TO GYNECOLOGY  -     US PELV NON OBS; Future      Follow-up Disposition:  Return if symptoms worsen or fail to improve. Milagros Parker DNP, FNP-BC, BC-ADM  Christopher Tinoco expressed understanding of this plan.

## 2019-02-14 NOTE — PROGRESS NOTES
Reviewed AVS, prescription and pharmacy location with patient. AVS printed and given. Pelvic and transvaginal Ultrasound were scheduled for patient; date, time and place of test was approved/agreed by patient. Patient aware to arrive 30 minutes prior to outpatient registration. Take a picture ID, drink 32-46oz of clear liquids starting 1 hour prior the test, try not to void, and no cardonated drinks or milk the day of the test.Care card/financial assistance process explained to patient. Patient to RTC if s/s worsen or fail to improve This has been fully explained to the patient, who indicates understanding and agrees with plan. No further questions at this time.  Goyo Gibbs RN

## 2019-02-14 NOTE — PROGRESS NOTES
Coordination of Care  1. Have you been to the ER, urgent care clinic since your last visit? Hospitalized since your last visit? No    2. Have you seen or consulted any other health care providers outside of the [address] [place] [address] since your last visit? Include any pap smears or colon screening. No    Does the patient need refills? YES    Learning Assessment Complete?  yes  Depression Screening complete in the past 12 months? yes

## 2019-02-20 ENCOUNTER — HOSPITAL ENCOUNTER (OUTPATIENT)
Dept: ULTRASOUND IMAGING | Age: 34
Discharge: HOME | End: 2019-02-20
Attending: NURSE PRACTITIONER
Payer: SELF-PAY

## 2019-02-20 DIAGNOSIS — N83.201 CYST OF RIGHT OVARY: ICD-10-CM

## 2019-02-20 PROCEDURE — 76856 US EXAM PELVIC COMPLETE: CPT

## 2019-02-20 PROCEDURE — 76830 TRANSVAGINAL US NON-OB: CPT

## 2019-02-22 ENCOUNTER — OFFICE VISIT (OUTPATIENT)
Dept: FAMILY MEDICINE CLINIC | Age: 34
End: 2019-02-22

## 2019-02-22 VITALS
TEMPERATURE: 98 F | SYSTOLIC BLOOD PRESSURE: 133 MMHG | DIASTOLIC BLOOD PRESSURE: 77 MMHG | HEART RATE: 70 BPM | WEIGHT: 248 LBS | BODY MASS INDEX: 41.07 KG/M2

## 2019-02-22 DIAGNOSIS — N94.6 MENSES PAINFUL: Primary | ICD-10-CM

## 2019-02-22 RX ORDER — NORGESTIMATE AND ETHINYL ESTRADIOL 7DAYSX3 28
1 KIT ORAL DAILY
Qty: 1 PACKAGE | Refills: 5 | Status: SHIPPED | OUTPATIENT
Start: 2019-02-22

## 2019-02-22 NOTE — PROGRESS NOTES
Coordination of Care  1. Have you been to the ER, urgent care clinic since your last visit? Hospitalized since your last visit? No    2. Have you seen or consulted any other health care providers outside of the [address] [place] [address] since your last visit? Include any pap smears or colon screening. No    Does the patient need refills? NO    Learning Assessment Complete?  yes  Depression Screening complete in the past 12 months? yes

## 2019-02-22 NOTE — PROGRESS NOTES
Assessment/Plan:       ICD-10-CM ICD-9-CM    1. Menses painful N94.6 625.3 norgestimate-ethinyl estradiol (ORTHO TRI-CYCLEN, TRI-SPRINTEC) 0.18/0.215/0.25 mg-35 mcg (28) tab     Follow-up Disposition:  Return for 3 months. [address] [place] [address], [address] [place] [address]  [address] [id number]  Phone: [phone] Fax: [phone]      [facility]  Subjective:     Chief Complaint   Patient presents with    Results     Ultrasound    Awilda Shankar is a 35 y.o. PATIENT REFUSED female. HPI: Normally she has the cramps like she is on her menstrual period. Has the pain in the right side of the low abdomen. She  has no past medical history on file. Ultrasound results discussed. Review of Systems: Negative for: fever, chest pain, shortness of breath, leg swelling, exertional dyspnea, palpitations. Current Medications:   Current Outpatient Medications on File Prior to Visit   Medication Sig    ibuprofen (MOTRIN) 800 mg tablet Take 1 Tab by mouth every six (6) hours as needed for Pain. Angolan sig    acetaminophen (TYLENOL) 500 mg tablet Take 2 Tabs by mouth every six (6) hours as needed for Pain.  omeprazole (PRILOSEC) 20 mg capsule Take 1 Cap by mouth daily. No current facility-administered medications on file prior to visit. Social History: She  reports that  has never smoked. she has never used smokeless tobacco. She reports that she drinks alcohol. She reports that she does not use drugs. Objective:     Vitals:    19 1300 19 1302   BP: (!) 144/95 133/77   Pulse: 66 70   Temp: 98 °F (36.7 °C)    TempSrc: Oral    Weight: 248 lb (112.5 kg)     Patient's last menstrual period was 2019. Period started Tuesday, left Thursday. She has a lot of clots after 2 days. Wt Readings from Last 2 Encounters:   19 248 lb (112.5 kg)   19 256 lb 6.4 oz (116.3 kg)   She has had , gallbladder, and ovary.   Her last pap was in November. Her pain is too much when she is on her period. Physical Examination: Pain of the RLQ, no rebound tenderness. General appearance - well developed, no acute distress. Chest - clear to auscultation. Heart - regular rate and rhythm without murmurs, rubs, or gallops. Abdomen - bowel sounds present x 4, NT, ND. Extremities - pulses intact. No peripheral edema. Assessment/Plan:   Diagnoses and all orders for this visit:    1. Menses painful  -     norgestimate-ethinyl estradiol (ORTHO TRI-CYCLEN, TRI-SPRINTEC) 0.18/0.215/0.25 mg-35 mcg (28) tab; Take 1 Tab by mouth daily. [facility]      Follow-up Disposition:  Return for 3 months. Sigifredo Porter DNP, FNP-BC, BC-ADM  Kayla Acosta expressed understanding of this plan.

## 2019-04-16 ENCOUNTER — HOSPITAL ENCOUNTER (EMERGENCY)
Age: 34
Discharge: HOME | End: 2019-04-16
Attending: EMERGENCY MEDICINE | Admitting: EMERGENCY MEDICINE
Payer: SELF-PAY

## 2019-04-16 VITALS
OXYGEN SATURATION: 98 % | TEMPERATURE: 98.2 F | DIASTOLIC BLOOD PRESSURE: 84 MMHG | RESPIRATION RATE: 16 BRPM | HEART RATE: 91 BPM | SYSTOLIC BLOOD PRESSURE: 132 MMHG

## 2019-04-16 DIAGNOSIS — J03.90 TONSILLITIS: Primary | ICD-10-CM

## 2019-04-16 DIAGNOSIS — J02.0 ACUTE STREPTOCOCCAL PHARYNGITIS: ICD-10-CM

## 2019-04-16 LAB — S PYO AG THROAT QL: POSITIVE

## 2019-04-16 PROCEDURE — 87880 STREP A ASSAY W/OPTIC: CPT

## 2019-04-16 PROCEDURE — 74011250637 HC RX REV CODE- 250/637: Performed by: PHYSICIAN ASSISTANT

## 2019-04-16 PROCEDURE — 99284 EMERGENCY DEPT VISIT MOD MDM: CPT

## 2019-04-16 RX ORDER — ACETAMINOPHEN 325 MG/1
975 TABLET ORAL
Status: COMPLETED | OUTPATIENT
Start: 2019-04-16 | End: 2019-04-16

## 2019-04-16 RX ORDER — CLINDAMYCIN HYDROCHLORIDE 150 MG/1
300 CAPSULE ORAL 3 TIMES DAILY
Qty: 60 CAP | Refills: 0 | Status: SHIPPED | OUTPATIENT
Start: 2019-04-16 | End: 2019-04-26

## 2019-04-16 RX ORDER — CLINDAMYCIN HYDROCHLORIDE 150 MG/1
300 CAPSULE ORAL
Status: COMPLETED | OUTPATIENT
Start: 2019-04-16 | End: 2019-04-16

## 2019-04-16 RX ORDER — DEXAMETHASONE SODIUM PHOSPHATE 10 MG/ML
10 INJECTION INTRAMUSCULAR; INTRAVENOUS ONCE
Status: COMPLETED | OUTPATIENT
Start: 2019-04-16 | End: 2019-04-16

## 2019-04-16 RX ORDER — IBUPROFEN 600 MG/1
600 TABLET ORAL
Qty: 20 TAB | Refills: 0 | Status: SHIPPED | OUTPATIENT
Start: 2019-04-16

## 2019-04-16 RX ORDER — IBUPROFEN 600 MG/1
600 TABLET ORAL
Qty: 20 TAB | Refills: 0 | Status: SHIPPED | OUTPATIENT
Start: 2019-04-16 | End: 2019-04-16

## 2019-04-16 RX ORDER — HYDROCODONE BITARTRATE AND ACETAMINOPHEN 7.5; 325 MG/15ML; MG/15ML
15 SOLUTION ORAL
Qty: 118 ML | Refills: 0 | Status: SHIPPED | OUTPATIENT
Start: 2019-04-16 | End: 2019-04-19

## 2019-04-16 RX ADMIN — ACETAMINOPHEN 975 MG: 325 TABLET ORAL at 03:32

## 2019-04-16 RX ADMIN — CLINDAMYCIN HYDROCHLORIDE 300 MG: 150 CAPSULE ORAL at 03:32

## 2019-04-16 RX ADMIN — DEXAMETHASONE SODIUM PHOSPHATE 10 MG: 10 INJECTION INTRAMUSCULAR; INTRAVENOUS at 03:32

## 2019-04-16 NOTE — ED TRIAGE NOTES
Patient presents to the emergency department reporting headache, sore throat, and body aches since Friday. Patient has taken ibuprofen without relief of pain. Patient is Italian speaking, and was triaged using  phone.

## 2019-04-16 NOTE — ED NOTES
Patient has been medically cleared for discharge at this time. She was given all discharge instructions with the help of a . She was given all prescriptions to go home with. Patient verbalized understanding. She was seen leaving the department with a steady gait and all belongings.

## 2019-04-16 NOTE — ED PROVIDER NOTES
36 yo F with hx of cholecystectomy here for evaluation of sore throat. States fever of 100 and sore throat x 4 days. Associated HA and body aches. Motrin without relief. Denies cough, CP, SOB, abd pain, flank pain, urinary symptoms. No known sick contacts. The history is provided by the patient. The history is limited by a language barrier. A  was used. Sore Throat The current episode started more than 2 days ago. There has been a fever of 100 - 100.9 F. Associated symptoms include congestion and headaches. Pertinent negatives include no vomiting, no ear discharge, no shortness of breath, no stridor and no cough. She has tried NSAIDs for the symptoms. History reviewed. No pertinent past medical history. Past Surgical History:  
Procedure Laterality Date  HX GYN    
 HX LAP CHOLECYSTECTOMY History reviewed. No pertinent family history. Social History Socioeconomic History  Marital status: SINGLE Spouse name: Not on file  Number of children: Not on file  Years of education: Not on file  Highest education level: Not on file Occupational History  Not on file Social Needs  Financial resource strain: Not on file  Food insecurity:  
  Worry: Not on file Inability: Not on file  Transportation needs:  
  Medical: Not on file Non-medical: Not on file Tobacco Use  Smoking status: Never Smoker  Smokeless tobacco: Never Used Substance and Sexual Activity  Alcohol use: Yes Alcohol/week: 0.0 oz  
  Comment: socially  Drug use: No  
 Sexual activity: Not Currently Lifestyle  Physical activity:  
  Days per week: Not on file Minutes per session: Not on file  Stress: Not on file Relationships  Social connections:  
  Talks on phone: Not on file Gets together: Not on file Attends Restorationist service: Not on file Active member of club or organization: Not on file Attends meetings of clubs or organizations: Not on file Relationship status: Not on file  Intimate partner violence:  
  Fear of current or ex partner: Not on file Emotionally abused: Not on file Physically abused: Not on file Forced sexual activity: Not on file Other Topics Concern  Not on file Social History Narrative ** Merged History Encounter **  
    
 ** Merged History Encounter ** ALLERGIES: Dilaudid [hydromorphone (bulk)]; Other medication; and Vancomycin Review of Systems Constitutional: Negative. HENT: Positive for congestion and sore throat. Negative for ear discharge. Eyes: Negative for photophobia, pain, discharge and visual disturbance. Respiratory: Negative for apnea, cough, chest tightness, shortness of breath and stridor. Cardiovascular: Negative for chest pain, palpitations and leg swelling. Gastrointestinal: Negative for abdominal distention, abdominal pain, blood in stool and vomiting. Genitourinary: Negative for difficulty urinating, dysuria, flank pain, frequency and hematuria. Musculoskeletal: Positive for myalgias. Negative for back pain, gait problem, joint swelling and neck pain. Skin: Negative for color change and pallor. Neurological: Positive for headaches. Negative for dizziness, syncope, weakness and numbness. Psychiatric/Behavioral: Negative for behavioral problems and confusion. The patient is not nervous/anxious. Vitals:  
 04/16/19 0111 04/16/19 0251 BP:  134/88 Pulse: 86 94 Resp:  15 Temp:  98.3 °F (36.8 °C) SpO2: 100% 97% Physical Exam  
Constitutional: She is oriented to person, place, and time. She appears well-developed and well-nourished. HENT:  
Head: Normocephalic and atraumatic.   
Right Ear: External ear normal.  
Left Ear: External ear normal.  
Nose: Nose normal.  
Mouth/Throat: Oropharynx is clear and moist.  
 Bilateral tonsils 2+; no abscess noted; no drooling or difficulty swallowing Eyes: Pupils are equal, round, and reactive to light. Conjunctivae and EOM are normal. Right eye exhibits no discharge. Left eye exhibits no discharge. Neck: Normal range of motion. Neck supple. No meningeal signs Cardiovascular: Normal rate, regular rhythm, normal heart sounds and intact distal pulses. Pulmonary/Chest: Effort normal and breath sounds normal.  
Abdominal: Soft. Bowel sounds are normal. She exhibits no distension. There is no tenderness. There is no rebound and no guarding. Musculoskeletal: Normal range of motion. She exhibits no edema or tenderness. Lymphadenopathy:  
  She has cervical adenopathy. Neurological: She is alert and oriented to person, place, and time. No cranial nerve deficit. Coordination normal.  
Skin: Skin is warm and dry. No rash noted. Psychiatric: She has a normal mood and affect. Her behavior is normal. Judgment and thought content normal.  
Nursing note and vitals reviewed. MDM Number of Diagnoses or Management Options Acute streptococcal pharyngitis:  
Tonsillitis:  
  
Amount and/or Complexity of Data Reviewed Clinical lab tests: ordered and reviewed Discuss the patient with other providers: yes Procedures Patient has been reassessed. +strep; Ready to discharge home. Patient's results have been reviewed with them. Patient and/or family have verbally conveyed their understanding and agreement of the patient's signs, symptoms, diagnosis, treatment and prognosis and additionally agree to follow up as recommended or return to the Emergency Room should their condition change prior to follow-up. Discharge instructions have also been provided to the patient with some educational information regarding their diagnosis as well a list of reasons why they would want to return to the ER prior to their follow-up appointment should their condition change. Sallie Noel, PA

## 2019-04-16 NOTE — DISCHARGE INSTRUCTIONS
Patient Education        Faringitis estreptocócica: Instrucciones de cuidado - [ Strep Throat: Care Instructions ]  Instrucciones de cuidado    La faringitis estreptocócica es konstantin infección bacteriana que provoca fiebre y dolor de garganta repentinos e intensos. La faringitis estreptocócica, causada por bacterias llamadas estreptococos, se trata con antibióticos. En ocasiones es necesaria konstantin prueba de estreptococos para determinar si el dolor de garganta es causado por esta bacteria. El tratamiento puede ayudar a aliviar los síntomas y podría prevenir problemas futuros. La atención de seguimiento es konstantin parte clave de reyna tratamiento y seguridad. Asegúrese de hacer y acudir a todas las citas, y llame a reyna médico si está teniendo problemas. También es konstantin buena idea saber los resultados de hailee exámenes y mantener konstantin lista de los medicamentos que lacho. ¿Cómo puede cuidarse en el hogar? · [address] No deje de tomarlos por el hecho de sentirse mejor. Debe ángela todos los antibióticos hasta terminarlos. · La faringitis estreptocócica puede contagiarse a otros hasta 24 horas después de iniciado el tratamiento con antibióticos. Tj iqra Leisa, usted debe evitar el contacto con otras personas en el trabajo o reyna hogar, especialmente con bebés y [place]. No estornude ni tosa cerca de [place], y [place] las kasandra con frecuencia. [place] y utensilios de los demás y lávelos vamsi con [place] y [place]. · Sylvia gárgaras con agua tibia con sal cada hora, wilner mínimo, para ayudar a reducir la hinchazón y sentirse mejor de la garganta. Mezcle 1 cucharadita de sal con 8 onzas líquidas (240 ml) de agua tibia. · [place] un analgésico (medicamento para el dolor) de venta tamanna, wilner acetaminofén (Tylenol), ibuprofeno (Advil, Motrin) o naproxeno (Aleve). Carissa y siga todas las instrucciones de la [place].   · Pruebe un aerosol anestésico o pastillas para la garganta de [employer], los cuales pueden ayudar a aliviar el dolor de garganta. · [place] abundantes líquidos. Los líquidos podrían ayudar a aliviar la irritación de la garganta. Los líquidos calientes, wilner el té o las sopas, podrían ayudarle a sentirse mejor de la garganta. · Coma alimentos sólidos suaves y dena abundantes líquidos mojgan. También podrían aliviar el dolor de garganta las paletas de agua de sabores, helado, huevos revueltos, sorbete y [place] de gelatina (wilner Jell-O). · Descanse mucho. · No fume y evite el humo de otros. Si necesita ayuda para dejar de fumar, hable con reyna médico sobre programas y medicamentos para dejar de fumar. Estos pueden aumentar hailee probabilidades de dejar el hábito para siempre. · Utilice un vaporizador o humidificador para añadir humedad al aire de reyna dormitorio. Siga las instrucciones para limpiar el aparato. ¿Cuándo debe pedir ayuda? Llame a reyna médico ahora mismo o busque atención médica inmediata si:    · Tiene fiebre nueva o más gus.     · Tiene fiebre junto con rigidez en el huma o un dolor de nova intenso.     · Tiene dificultades para tragar o estas empeoran.     · El dolor de garganta empeora mucho en un lado.     · El dolor se vuelve mucho más intenso en un lado de la garganta.    Preste especial atención a los cambios en reyna velvet y asegúrese de comunicarse con reyna médico si:    · No mejora después de 2 días (48 horas).     · No mejora wilner se esperaba. ¿Dónde puede encontrar más información en inglés? Magdi Harrison a http://césar-clovis.info/. Heather Uribe D884 en la búsqueda para aprender Yves Nick de \"Faringitis estreptocócica: Instrucciones de cuidado - [ Strep Throat: Care Instructions ]. \"  Revisado: Hans 67, 2018  Versión del contenido: 11.9  © 7080-3981 [employer], [employer]. Las instrucciones de cuidado fueron adaptadas bajo licencia por Good Help Connections (which disclaims liability or warranty for this information).  Si usted tiene preguntas sobre konstantin afección médica o sobre estas instrucciones, siempre pregunte a reyna profesional de velvet. [employer] niega toda garantía o responsabilidad por reyna uso de esta información. Amigdalitis: Instrucciones de cuidado - [ Tonsillitis: Care Instructions ]  Instrucciones de cuidado    La amigdalitis es konstantin infección de las amígdalas causada por konstantin bacteria o un virus. Las amígdalas se encuentran en la parte posterior de la garganta y flores parte del sistema inmunitario. La amigdalitis normalmente dura de unos cuantos amanda a un par [employer]. La amigdalitis causada por un virus desaparece por sí misma. La amigdalitis causada por las bacterias que provocan faringitis estreptocócica se trata con antibióticos. Usted y reyna médico pueden considerar la extirpación quirúrgica de las amígdalas (amigdalectomía) si usted presenta complicaciones graves o infecciones recurrentes. La atención de seguimiento es konstantin parte clave de reyna tratamiento y seguridad. Asegúrese de hacer y acudir a todas las citas, y llame a reyna médico si está teniendo problemas. También es konstantin buena idea saber los resultados de hailee exámenes y mantener konstantin lista de los medicamentos que lacho. ¿Cómo puede cuidarse en el hogar? · Si reyna médico le recetó antibióticos, tómelos según las indicaciones. No deje de tomarlos por el hecho de sentirse mejor. Debe ángela todos los antibióticos hasta terminarlos. · Sylvia gárgaras de agua tibia con sal. [place] ayuda a reducir la hinchazón y [employer]. Sylvia gárgaras konstantin vez cada hora con 1 cucharadita de sal mezclada con 8 onzas (240 ml) de agua tibia. · [place] un analgésico (medicamento para el dolor) de venta tamanna, wilner acetaminofén (Tylenol), ibuprofeno (Advil, Motrin) o naproxeno (Aleve). Sea armida con los medicamentos. Carissa y siga todas las instrucciones de la [place]. Ninguna persona georgiana de 20 años debe ángela aspirina.  Esta ha sido relacionada con el síndrome de [place], Roy Songster enfermedad grave. · Tenga cuidado cuando tome medicamentos de venta tamanna para el resfriado común o la gripe y Tylenol al [employer]. Muchos de estos medicamentos contienen acetaminofén, o sea, Tylenol. Carissa las etiquetas para asegurarse de que no está tomando konstantin dosis mayor que la recomendada. El exceso de acetaminofén (Tylenol) puede ser dañino. · Pruebe un aerosol de venta tamanna para la garganta para aliviar el dolor. · [place] abundantes líquidos. Los líquidos podrían ayudar a aliviar la irritación de la garganta. [place] líquidos tibios o frescos (lo que le carlito sentir mejor). Estos incluyen té, sopa y [place]. · No fume y evite el humo del tabaco ambiental. Fumar puede empeorar la amigdalitis. Si necesita ayuda para dejar de fumar, hable con reyna médico acerca de programas y medicamentos para dejar de fumar. Estos pueden aumentar hailee probabilidades de dejar el hábito para siempre. · Use un humidificador o vaporizador para añadir humedad en reyna dormitorio. Siga las instrucciones para limpiar el aparato. ¿Cuándo debe pedir ayuda? Llame a reyna médico ahora mismo o busque atención médica inmediata si:    · Empeora el dolor en un lado de la garganta.     · Tiene fiebre nueva o más gus.     · Nota cambios en reyna voz.     · Tiene dificultades para abrir la boca.     · Tiene cualquier dificultad para respirar.     · Tiene dificultades mucho mayores para tragar.     · Tiene fiebre junto con rigidez en el huma o dolor de nova intenso.     · Está sensible a la kathrine o se siente muy somnoliento (con sueño) o confuso.    Preste especial atención a los cambios en reyna velvet y asegúrese de comunicarse con reyna médico si:    · No mejora después de 2 días. ¿Dónde puede encontrar más información en inglés? Abisai Uribe a http://césar-clovis.info/. Lupe Martinez M003 en la búsqueda para aprender más acerca de \"Amigdalitis: Instrucciones de cuidado - [ Tonsillitis: Care Instructions ]. \"  Revisado: 27 marzo, 2018  Versión del contenido: 11.9  © 1837-8856 [employer], [employer]. Las instrucciones de cuidado fueron adaptadas bajo licencia por Good Help Connections (which disclaims liability or warranty for this information). Si usted tiene [place] afección médica o sobre estas instrucciones, siempre pregunte a reyna profesional de velvet. Healthwise, Incorporated niega toda garantía o responsabilidad por reyna uso de esta información.

## 2019-04-16 NOTE — LETTER
[address] 
[address] [facility] [address] [address] 
[phone] Work/School Note Date: 4/16/2019 To Whom It May concern: 
 
Cathy Ball was seen and treated today in the emergency room by the following provider(s): 
Attending Provider: Madiha Campos MD 
Physician Assistant: HEMALATHA Henley. Cathy Ball may return to work on Friday; sooner if symptoms improve. Sincerely, HEMALATHA Hardin

## 2019-04-23 ENCOUNTER — DOCUMENTATION ONLY (OUTPATIENT)
Dept: FAMILY MEDICINE CLINIC | Age: 34
End: 2019-04-23

## 2019-04-23 NOTE — PROGRESS NOTES
No show for initial appointment. Also had a no show for initial appointment in 2017 with no follow up from patient. . Will be offered opportunity to re-schedule.

## 2019-05-01 ENCOUNTER — TELEPHONE (OUTPATIENT)
Dept: FAMILY MEDICINE CLINIC | Age: 34
End: 2019-05-01

## 2019-05-01 NOTE — TELEPHONE ENCOUNTER
P/C to Benny Pradhan to reschedule the no show appointment with the counselor on 04/23/19. Patient stated that she didn't want to reschedule the appointment at this time. I provided my direct contact information just in case she wants to reschedule in the future. Per appointment tickler created on 03/28/19 the patient was self-referred. I am routing the P/C to Dr. Roseline Tafoya because the patient has an appointment with her on 06/21/19.     P/C routed to [facility] LCSW & Humza Cohen MD.    Erasmo Faith

## 2019-09-29 ENCOUNTER — HOSPITAL ENCOUNTER (EMERGENCY)
Age: 34
Discharge: HOME | End: 2019-09-29
Attending: EMERGENCY MEDICINE | Admitting: EMERGENCY MEDICINE
Payer: SELF-PAY

## 2019-09-29 ENCOUNTER — APPOINTMENT (OUTPATIENT)
Dept: CT IMAGING | Age: 34
End: 2019-09-29
Attending: PHYSICIAN ASSISTANT
Payer: SELF-PAY

## 2019-09-29 ENCOUNTER — APPOINTMENT (OUTPATIENT)
Dept: ULTRASOUND IMAGING | Age: 34
End: 2019-09-29
Attending: PHYSICIAN ASSISTANT
Payer: SELF-PAY

## 2019-09-29 VITALS
OXYGEN SATURATION: 100 % | WEIGHT: 246 LBS | TEMPERATURE: 98.8 F | RESPIRATION RATE: 16 BRPM | HEIGHT: 67 IN | DIASTOLIC BLOOD PRESSURE: 82 MMHG | SYSTOLIC BLOOD PRESSURE: 143 MMHG | HEART RATE: 65 BPM | BODY MASS INDEX: 38.61 KG/M2

## 2019-09-29 DIAGNOSIS — R10.31 RLQ ABDOMINAL PAIN: Primary | ICD-10-CM

## 2019-09-29 LAB
ALBUMIN SERPL-MCNC: 3.7 G/DL (ref 3.5–5)
ALBUMIN/GLOB SERPL: 0.9 {RATIO} (ref 1.1–2.2)
ALP SERPL-CCNC: 78 U/L (ref 45–117)
ALT SERPL-CCNC: 48 U/L (ref 12–78)
ANION GAP SERPL CALC-SCNC: 7 MMOL/L (ref 5–15)
APPEARANCE UR: CLEAR
AST SERPL-CCNC: 29 U/L (ref 15–37)
BACTERIA URNS QL MICRO: NEGATIVE /HPF
BASOPHILS # BLD: 0.1 K/UL (ref 0–0.1)
BASOPHILS NFR BLD: 1 % (ref 0–1)
BILIRUB SERPL-MCNC: 0.3 MG/DL (ref 0.2–1)
BILIRUB UR QL: NEGATIVE
BUN SERPL-MCNC: 10 MG/DL (ref 6–20)
BUN/CREAT SERPL: 13 (ref 12–20)
CALCIUM SERPL-MCNC: 8.8 MG/DL (ref 8.5–10.1)
CHLORIDE SERPL-SCNC: 106 MMOL/L (ref 97–108)
CO2 SERPL-SCNC: 28 MMOL/L (ref 21–32)
COLOR UR: ABNORMAL
COMMENT, HOLDF: NORMAL
CREAT SERPL-MCNC: 0.75 MG/DL (ref 0.55–1.02)
DIFFERENTIAL METHOD BLD: NORMAL
EOSINOPHIL # BLD: 0.4 K/UL (ref 0–0.4)
EOSINOPHIL NFR BLD: 4 % (ref 0–7)
EPITH CASTS URNS QL MICRO: ABNORMAL /LPF
ERYTHROCYTE [DISTWIDTH] IN BLOOD BY AUTOMATED COUNT: 14.2 % (ref 11.5–14.5)
GLOBULIN SER CALC-MCNC: 4.1 G/DL (ref 2–4)
GLUCOSE SERPL-MCNC: 87 MG/DL (ref 65–100)
GLUCOSE UR STRIP.AUTO-MCNC: NEGATIVE MG/DL
HCG UR QL: NEGATIVE
HCT VFR BLD AUTO: 41 % (ref 35–47)
HGB BLD-MCNC: 12.6 G/DL (ref 11.5–16)
HGB UR QL STRIP: ABNORMAL
IMM GRANULOCYTES # BLD AUTO: 0 K/UL (ref 0–0.04)
IMM GRANULOCYTES NFR BLD AUTO: 0 % (ref 0–0.5)
KETONES UR QL STRIP.AUTO: NEGATIVE MG/DL
LEUKOCYTE ESTERASE UR QL STRIP.AUTO: NEGATIVE
LYMPHOCYTES # BLD: 2 K/UL (ref 0.8–3.5)
LYMPHOCYTES NFR BLD: 19 % (ref 12–49)
MCH RBC QN AUTO: 26.4 PG (ref 26–34)
MCHC RBC AUTO-ENTMCNC: 30.7 G/DL (ref 30–36.5)
MCV RBC AUTO: 86 FL (ref 80–99)
MONOCYTES # BLD: 0.7 K/UL (ref 0–1)
MONOCYTES NFR BLD: 7 % (ref 5–13)
NEUTS SEG # BLD: 7.4 K/UL (ref 1.8–8)
NEUTS SEG NFR BLD: 69 % (ref 32–75)
NITRITE UR QL STRIP.AUTO: NEGATIVE
NRBC # BLD: 0 K/UL (ref 0–0.01)
NRBC BLD-RTO: 0 PER 100 WBC
PH UR STRIP: 5.5 [PH] (ref 5–8)
PLATELET # BLD AUTO: 330 K/UL (ref 150–400)
PMV BLD AUTO: 10.3 FL (ref 8.9–12.9)
POTASSIUM SERPL-SCNC: 3.8 MMOL/L (ref 3.5–5.1)
PROT SERPL-MCNC: 7.8 G/DL (ref 6.4–8.2)
PROT UR STRIP-MCNC: NEGATIVE MG/DL
RBC # BLD AUTO: 4.77 M/UL (ref 3.8–5.2)
RBC #/AREA URNS HPF: ABNORMAL /HPF (ref 0–5)
SAMPLES BEING HELD,HOLD: NORMAL
SODIUM SERPL-SCNC: 141 MMOL/L (ref 136–145)
SP GR UR REFRACTOMETRY: 1.02 (ref 1–1.03)
UR CULT HOLD, URHOLD: NORMAL
UROBILINOGEN UR QL STRIP.AUTO: 0.2 EU/DL (ref 0.2–1)
WBC # BLD AUTO: 10.6 K/UL (ref 3.6–11)
WBC URNS QL MICRO: ABNORMAL /HPF (ref 0–4)

## 2019-09-29 PROCEDURE — 74011000258 HC RX REV CODE- 258: Performed by: RADIOLOGY

## 2019-09-29 PROCEDURE — 81025 URINE PREGNANCY TEST: CPT

## 2019-09-29 PROCEDURE — 76856 US EXAM PELVIC COMPLETE: CPT

## 2019-09-29 PROCEDURE — 36415 COLL VENOUS BLD VENIPUNCTURE: CPT

## 2019-09-29 PROCEDURE — 85025 COMPLETE CBC W/AUTO DIFF WBC: CPT

## 2019-09-29 PROCEDURE — 99284 EMERGENCY DEPT VISIT MOD MDM: CPT

## 2019-09-29 PROCEDURE — 81001 URINALYSIS AUTO W/SCOPE: CPT

## 2019-09-29 PROCEDURE — 96374 THER/PROPH/DIAG INJ IV PUSH: CPT

## 2019-09-29 PROCEDURE — 74011250636 HC RX REV CODE- 250/636: Performed by: PHYSICIAN ASSISTANT

## 2019-09-29 PROCEDURE — 74177 CT ABD & PELVIS W/CONTRAST: CPT

## 2019-09-29 PROCEDURE — 76830 TRANSVAGINAL US NON-OB: CPT

## 2019-09-29 PROCEDURE — 96375 TX/PRO/DX INJ NEW DRUG ADDON: CPT

## 2019-09-29 PROCEDURE — 80053 COMPREHEN METABOLIC PANEL: CPT

## 2019-09-29 PROCEDURE — 74011636320 HC RX REV CODE- 636/320: Performed by: RADIOLOGY

## 2019-09-29 RX ORDER — SODIUM CHLORIDE 0.9 % (FLUSH) 0.9 %
10 SYRINGE (ML) INJECTION
Status: COMPLETED | OUTPATIENT
Start: 2019-09-29 | End: 2019-09-29

## 2019-09-29 RX ORDER — KETOROLAC TROMETHAMINE 30 MG/ML
30 INJECTION, SOLUTION INTRAMUSCULAR; INTRAVENOUS
Status: COMPLETED | OUTPATIENT
Start: 2019-09-29 | End: 2019-09-29

## 2019-09-29 RX ORDER — DICYCLOMINE HYDROCHLORIDE 20 MG/1
20 TABLET ORAL EVERY 6 HOURS
Qty: 20 TAB | Refills: 0 | Status: SHIPPED | OUTPATIENT
Start: 2019-09-29 | End: 2019-10-04

## 2019-09-29 RX ORDER — MORPHINE SULFATE 2 MG/ML
4 INJECTION, SOLUTION INTRAMUSCULAR; INTRAVENOUS
Status: COMPLETED | OUTPATIENT
Start: 2019-09-29 | End: 2019-09-29

## 2019-09-29 RX ADMIN — SODIUM CHLORIDE 100 ML: 900 INJECTION, SOLUTION INTRAVENOUS at 19:13

## 2019-09-29 RX ADMIN — MORPHINE SULFATE 4 MG: 2 INJECTION, SOLUTION INTRAMUSCULAR; INTRAVENOUS at 19:15

## 2019-09-29 RX ADMIN — KETOROLAC TROMETHAMINE 30 MG: 30 INJECTION, SOLUTION INTRAMUSCULAR at 17:29

## 2019-09-29 RX ADMIN — IOPAMIDOL 100 ML: 755 INJECTION, SOLUTION INTRAVENOUS at 19:12

## 2019-09-29 RX ADMIN — Medication 10 ML: at 19:12

## 2019-09-29 NOTE — DISCHARGE INSTRUCTIONS
Patient Education        Dolor abdominal: Instrucciones de cuidado - [ Abdominal Pain: Care Instructions ]  Instrucciones de cuidado    El dolor abdominal tiene muchas causas posibles. Algunas de ellas no son graves y mejoran por sí solas en unos días. Otras requieren [place] y [place]. Si reyna dolor continúa o [place], necesitará konstantin nueva revisión y [place] pruebas para determinar qué pasa. Es posible que necesite cirugía para corregir el problema. No ignore nuevos síntomas, wilner fiebre, náuseas y [place], [facility], dolor que [place] o [place]. Podrían ser señales de un problema más grave. Reyna médico puede haberle recomendado konstantin consulta de [employer] Emmanuel las 8 o 12 horas siguientes. Si no se siente mejor, es posible que requiera Linda [place] o [place]. El médico lo blackmon revisado minuciosamente, barry puede josse problemas más tarde. Si nota algún problema o síntomas nuevos, busque tratamiento médico inmediatamente. La atención de seguimiento es konstantin parte clave de reyna tratamiento y seguridad. Asegúrese de hacer y acudir a todas las citas, y llame a reyna médico si está teniendo problemas. También es konstantin buena idea saber los resultados de hailee exámenes y mantener konstantin lista de los medicamentos que lacho. ¿Cómo puede cuidarse en el hogar? · Descanse hasta que se sienta mejor. · Para prevenir la deshidratación, dena abundantes líquidos, suficientes para que reyna orina sea de color amarillo norma o transparente wilner el agua. Elija beber agua y otros líquidos mojgan sin cafeína hasta que se sienta mejor. Si tiene [facility], del corazón o del hígado y tiene que [place] líquidos, hable con reyna médico antes de aumentar reyna consumo. · Si tiene [employer], coma alimentos suaves, wilner arroz, pan dave seco o galletas saladas, bananas (plátanos) y puré de [place]. Trate de comer varias comidas pequeñas al día en lugar de dos o lea grandes.   · [place] hasta 48 horas después de que todos los síntomas hayan desaparecido antes de comer alimentos condimentados, alcohol y bebidas que contengan cafeína. · No consuma alimentos ricos en grasa. · Evite medicamentos antiinflamatorios wilner aspirina, ibuprofeno (Advil, Motrin) y naproxeno (Aleve). Pueden causar [employer]. Dígale a reyna médico si está tomando aspirina diariamente debido a otro problema de velvet. ¿Cuándo debe pedir ayuda? Llame al 911 en cualquier momento que considere que necesita atención de emergencia. Por ejemplo, llame si:    · Se desmayó (perdió el conocimiento).   · Las heces son de color rojizo o muy sanguinolentas (con katherine).   · Vomita katherine o algo parecido a granos de café molido.     · Tiene dolor abdominal nuevo e intenso.    Llame a reyna médico ahora mismo o busque atención médica inmediata si:    · Reyna dolor empeora, sobre todo si se concentra en konstantin berhane parte del vientre.     · Vuelve a tener fiebre o tiene fiebre más gus.     · Gala heces son negruzcas y parecidas al alquitrán o tienen rastros de katherine.     · Tiene sangrado vaginal inesperado.     · Tiene síntomas de konstantin infección del tracto urinario. Estos podrían incluir:  ? Dolor al Rosangela Beers. ? Orinar con más frecuencia que lo habitual.  ? [place] en la [facility].     · Siente mareos o aturdimiento, o que está a punto de desmayarse.    Preste especial atención a los cambios en reyna velvet y asegúrese de comunicarse con reyna médico si:    · No está mejorando después de 1 día (24 horas). ¿Dónde puede encontrar más información en inglés? Onel Goff a http://césar-clovis.info/. Michelle Ly R425 en la búsqueda para aprender más acerca de \"Dolor abdominal: Instrucciones de cuidado - [ Abdominal Pain: Care Instructions ]. \"  Revisado: 26 junio, 2019  Versión del contenido: 12.2  © 5259-5601 [employer], [place].  Las instrucciones de cuidado fueron adaptadas bajo licencia por Good Help Connections (which disclaims liability or warranty for this information). Si usted tiene [place] afección médica o sobre estas instrucciones, siempre pregunte a reyna profesional de velvet. [employer] niega toda garantía o responsabilidad por reyna uso de esta información.

## 2019-09-29 NOTE — ED NOTES
Discharge instructions given to pt using IRI  [id number]. All questions answered and pt verbalized understanding. V/S stable @ time of discharge. Pt ambulatory out of unit.

## 2019-09-29 NOTE — ED PROVIDER NOTES
Courtney Okeefe is a 29 y.o. female  who presents by private vehicle to ER with c/o Patient presents with:  Abdominal Pain  Patient presents with complaints of RLQ abdominal pain that started this morning. Patient took ibuprofen with minimal relief. Patient reports 1 episode of vomiting. Denies fever or chills. Patient denies urinary symptoms. Denies vaginal bleeding or discharge. Patient with history of ovarian cyst.     She specifically denies any fevers, chills,  chest pain, shortness of breath, headache, rash, diarrhea,  urinary/bowel changes, sweating or weight loss. PCP: None   PMHx significant for: History reviewed. No pertinent past medical history. PSHx significant for: Past Surgical History:  No date: HX GYN  No date: HX LAP CHOLECYSTECTOMY  Social Hx: Tobacco use: Social History    Tobacco Use      Smoking status: Never Smoker      Smokeless tobacco: Never Used  ; EtOH use: The patient states she drinks 0 per week.; Illicit Drug use: Allergies:   -- Dilaudid (Hydromorphone (Bulk)) -- Itching   -- Other Medication -- Swelling    --  Pt c/o of swelling in body after pain medication             administered to her after surgical procedure,  Pt             does not know what medication it was. -- Vancomycin -- Hives    There are no other complaints, changes or physical findings at this time. History reviewed. No pertinent past medical history. Past Surgical History:   Procedure Laterality Date    HX GYN      HX LAP CHOLECYSTECTOMY           History reviewed. No pertinent family history.     Social History     Socioeconomic History    Marital status: SINGLE     Spouse name: Not on file    Number of children: Not on file    Years of education: Not on file    Highest education level: Not on file   Occupational History    Not on file   Social Needs    Financial resource strain: Not on file    Food insecurity:     Worry: Not on file     Inability: Not on file   Wevod needs: Medical: Not on file     Non-medical: Not on file   Tobacco Use    Smoking status: Never Smoker    Smokeless tobacco: Never Used   Substance and Sexual Activity    Alcohol use: Yes     Alcohol/week: 0.0 standard drinks     Comment: socially    Drug use: No    Sexual activity: Not Currently   Lifestyle    Physical activity:     Days per week: Not on file     Minutes per session: Not on file    Stress: Not on file   Relationships    Social connections:     Talks on phone: Not on file     Gets together: Not on file     Attends Congregation service: Not on file     Active member of club or organization: Not on file     Attends meetings of clubs or organizations: Not on file     Relationship status: Not on file    Intimate partner violence:     Fear of current or ex partner: Not on file     Emotionally abused: Not on file     Physically abused: Not on file     Forced sexual activity: Not on file   Other Topics Concern    Not on file   Social History Narrative    ** Merged History Encounter **         ** Merged History Encounter **              ALLERGIES: Dilaudid [hydromorphone (bulk)]; Other medication; and Vancomycin    Review of Systems   Constitutional: Negative for activity change, chills and fever. HENT: Negative for congestion, rhinorrhea and sore throat. Respiratory: Negative for shortness of breath. Cardiovascular: Negative for chest pain and leg swelling. Gastrointestinal: Positive for abdominal pain, nausea and vomiting. Negative for diarrhea. Genitourinary: Negative for dysuria, vaginal bleeding and vaginal discharge. Musculoskeletal: Negative for arthralgias and myalgias. Neurological: Negative for dizziness. Psychiatric/Behavioral: Negative for confusion. All other systems reviewed and are negative.       Vitals:    09/29/19 1634   BP: 159/85   Pulse: 79   Resp: 16   Temp: 98.8 °F (37.1 °C)   SpO2: 100%   Weight: 111.6 kg (246 lb)   Height: 5' 7\" (1.702 m) Physical Exam   Constitutional: She is oriented to person, place, and time. She appears well-developed. HENT:   Head: Normocephalic and atraumatic. Right Ear: External ear normal.   Left Ear: External ear normal.   Nose: Nose normal.   Mouth/Throat: Oropharynx is clear and moist. No oropharyngeal exudate. Eyes: Conjunctivae, EOM and lids are normal. Right eye exhibits no discharge. Left eye exhibits no discharge. Neck: Normal range of motion. No tracheal deviation present. No thyromegaly present. Cardiovascular: Normal rate, regular rhythm, normal heart sounds and intact distal pulses. Pulmonary/Chest: Effort normal and breath sounds normal.   Abdominal: Soft. Normal appearance and bowel sounds are normal. There is tenderness in the right lower quadrant. Musculoskeletal: Normal range of motion. Neurological: She is alert and oriented to person, place, and time. Skin: Skin is warm and dry. Psychiatric: She has a normal mood and affect. Judgment normal.        MDM  Number of Diagnoses or Management Options  RLQ abdominal pain:   Diagnosis management comments: Assesment/Plan- 29 y.o. Patient presents with:  Abdominal Pain  differential includes: ovarian cyst, UTI, kidney stone, appendicitis. Labs and imaging reviewed with no acute findings. Patient is well appearing, afebrile and tolerating PO. Recommend PCP follow up. Patient educated on reasons to return to the ED.            Procedures

## 2023-10-08 ENCOUNTER — APPOINTMENT (OUTPATIENT)
Facility: HOSPITAL | Age: 38
End: 2023-10-08

## 2023-10-08 ENCOUNTER — HOSPITAL ENCOUNTER (EMERGENCY)
Facility: HOSPITAL | Age: 38
Discharge: HOME | End: 2023-10-08
Attending: EMERGENCY MEDICINE

## 2023-10-08 VITALS
OXYGEN SATURATION: 97 % | RESPIRATION RATE: 22 BRPM | HEART RATE: 91 BPM | TEMPERATURE: 97.5 F | SYSTOLIC BLOOD PRESSURE: 170 MMHG | DIASTOLIC BLOOD PRESSURE: 85 MMHG

## 2023-10-08 DIAGNOSIS — R07.9 CHEST PAIN, UNSPECIFIED TYPE: Primary | ICD-10-CM

## 2023-10-08 LAB
ALBUMIN SERPL-MCNC: 3.5 G/DL (ref 3.5–5)
ALBUMIN/GLOB SERPL: 0.9 (ref 1.1–2.2)
ALP SERPL-CCNC: 78 U/L (ref 45–117)
ALT SERPL-CCNC: 52 U/L (ref 12–78)
ANION GAP SERPL CALC-SCNC: 4 MMOL/L (ref 5–15)
AST SERPL-CCNC: 30 U/L (ref 15–37)
BASOPHILS # BLD: 0.1 K/UL (ref 0–0.1)
BASOPHILS NFR BLD: 1 % (ref 0–1)
BILIRUB SERPL-MCNC: 0.2 MG/DL (ref 0.2–1)
BUN SERPL-MCNC: 15 MG/DL (ref 6–20)
BUN/CREAT SERPL: 21 (ref 12–20)
CALCIUM SERPL-MCNC: 8.9 MG/DL (ref 8.5–10.1)
CHLORIDE SERPL-SCNC: 109 MMOL/L (ref 97–108)
CO2 SERPL-SCNC: 26 MMOL/L (ref 21–32)
COMMENT:: NORMAL
CREAT SERPL-MCNC: 0.7 MG/DL (ref 0.55–1.02)
D DIMER PPP FEU-MCNC: 0.37 MG/L FEU (ref 0–0.65)
DIFFERENTIAL METHOD BLD: ABNORMAL
EKG ATRIAL RATE: 84 BPM
EKG DIAGNOSIS: NORMAL
EKG P AXIS: 56 DEGREES
EKG P-R INTERVAL: 140 MS
EKG Q-T INTERVAL: 380 MS
EKG QRS DURATION: 84 MS
EKG QTC CALCULATION (BAZETT): 449 MS
EKG R AXIS: 47 DEGREES
EKG T AXIS: 47 DEGREES
EKG VENTRICULAR RATE: 84 BPM
EOSINOPHIL # BLD: 0.5 K/UL (ref 0–0.4)
EOSINOPHIL NFR BLD: 5 % (ref 0–7)
ERYTHROCYTE [DISTWIDTH] IN BLOOD BY AUTOMATED COUNT: 14.7 % (ref 11.5–14.5)
GLOBULIN SER CALC-MCNC: 4.1 G/DL (ref 2–4)
GLUCOSE SERPL-MCNC: 97 MG/DL (ref 65–100)
HCT VFR BLD AUTO: 39.5 % (ref 35–47)
HGB BLD-MCNC: 12.3 G/DL (ref 11.5–16)
IMM GRANULOCYTES # BLD AUTO: 0.1 K/UL (ref 0–0.04)
IMM GRANULOCYTES NFR BLD AUTO: 1 % (ref 0–0.5)
LYMPHOCYTES # BLD: 2.2 K/UL (ref 0.8–3.5)
LYMPHOCYTES NFR BLD: 21 % (ref 12–49)
MCH RBC QN AUTO: 26.1 PG (ref 26–34)
MCHC RBC AUTO-ENTMCNC: 31.1 G/DL (ref 30–36.5)
MCV RBC AUTO: 83.7 FL (ref 80–99)
MONOCYTES # BLD: 0.8 K/UL (ref 0–1)
MONOCYTES NFR BLD: 7 % (ref 5–13)
NEUTS SEG # BLD: 7 K/UL (ref 1.8–8)
NEUTS SEG NFR BLD: 65 % (ref 32–75)
NRBC # BLD: 0 K/UL (ref 0–0.01)
NRBC BLD-RTO: 0 PER 100 WBC
PLATELET # BLD AUTO: 401 K/UL (ref 150–400)
PMV BLD AUTO: 10.2 FL (ref 8.9–12.9)
POTASSIUM SERPL-SCNC: 3.9 MMOL/L (ref 3.5–5.1)
PROT SERPL-MCNC: 7.6 G/DL (ref 6.4–8.2)
RBC # BLD AUTO: 4.72 M/UL (ref 3.8–5.2)
SODIUM SERPL-SCNC: 139 MMOL/L (ref 136–145)
SPECIMEN HOLD: NORMAL
TROPONIN I SERPL HS-MCNC: 4 NG/L (ref 0–51)
TROPONIN I SERPL HS-MCNC: 5 NG/L (ref 0–51)
WBC # BLD AUTO: 10.6 K/UL (ref 3.6–11)

## 2023-10-08 PROCEDURE — 85379 FIBRIN DEGRADATION QUANT: CPT

## 2023-10-08 PROCEDURE — 85025 COMPLETE CBC W/AUTO DIFF WBC: CPT

## 2023-10-08 PROCEDURE — 93010 ELECTROCARDIOGRAM REPORT: CPT | Performed by: INTERNAL MEDICINE

## 2023-10-08 PROCEDURE — 93005 ELECTROCARDIOGRAM TRACING: CPT | Performed by: EMERGENCY MEDICINE

## 2023-10-08 PROCEDURE — 99285 EMERGENCY DEPT VISIT HI MDM: CPT

## 2023-10-08 PROCEDURE — 36415 COLL VENOUS BLD VENIPUNCTURE: CPT

## 2023-10-08 PROCEDURE — 84484 ASSAY OF TROPONIN QUANT: CPT

## 2023-10-08 PROCEDURE — 80053 COMPREHEN METABOLIC PANEL: CPT

## 2023-10-08 PROCEDURE — 6360000002 HC RX W HCPCS

## 2023-10-08 PROCEDURE — 96374 THER/PROPH/DIAG INJ IV PUSH: CPT

## 2023-10-08 PROCEDURE — 71046 X-RAY EXAM CHEST 2 VIEWS: CPT

## 2023-10-08 RX ORDER — KETOROLAC TROMETHAMINE 30 MG/ML
30 INJECTION, SOLUTION INTRAMUSCULAR; INTRAVENOUS
Status: COMPLETED | OUTPATIENT
Start: 2023-10-08 | End: 2023-10-08

## 2023-10-08 RX ADMIN — KETOROLAC TROMETHAMINE 30 MG: 30 INJECTION, SOLUTION INTRAMUSCULAR at 08:55

## 2023-10-08 ASSESSMENT — PAIN DESCRIPTION - PAIN TYPE: TYPE: ACUTE PAIN

## 2023-10-08 ASSESSMENT — HEART SCORE
ECG: 0
ECG: 0

## 2023-10-08 ASSESSMENT — PAIN DESCRIPTION - DESCRIPTORS: DESCRIPTORS: SHARP;SHOOTING

## 2023-10-08 ASSESSMENT — PAIN DESCRIPTION - LOCATION: LOCATION: CHEST

## 2023-10-08 ASSESSMENT — PAIN SCALES - GENERAL: PAINLEVEL_OUTOF10: 10

## 2023-10-08 ASSESSMENT — PAIN - FUNCTIONAL ASSESSMENT: PAIN_FUNCTIONAL_ASSESSMENT: 0-10

## 2023-10-08 ASSESSMENT — PAIN DESCRIPTION - ORIENTATION: ORIENTATION: LEFT

## 2023-10-08 NOTE — ED NOTES
Patient left ED in no acute distress, alert and oriented x4. Patient was encouraged to come back if symptoms get worse. Patient was provided with discharge instructions and prescriptions. All questions were answered. Patient left ambulatory.          Lilliana Pierre, [address]  [id number]

## 2023-10-08 NOTE — ED TRIAGE NOTES
Pt describes stabbing pain in her chest that woke her from sleep at 1 am. She states the pain makes it difficult to breath and it radiated down her left arm.

## 2023-10-14 ENCOUNTER — APPOINTMENT (OUTPATIENT)
Facility: HOSPITAL | Age: 38
End: 2023-10-14

## 2023-10-14 ENCOUNTER — HOSPITAL ENCOUNTER (EMERGENCY)
Facility: HOSPITAL | Age: 38
Discharge: HOME | End: 2023-10-14
Attending: EMERGENCY MEDICINE

## 2023-10-14 VITALS
HEART RATE: 95 BPM | SYSTOLIC BLOOD PRESSURE: 128 MMHG | OXYGEN SATURATION: 96 % | DIASTOLIC BLOOD PRESSURE: 80 MMHG | TEMPERATURE: 100 F | RESPIRATION RATE: 28 BRPM

## 2023-10-14 DIAGNOSIS — N10 ACUTE PYELONEPHRITIS: Primary | ICD-10-CM

## 2023-10-14 LAB
ALBUMIN SERPL-MCNC: 3.7 G/DL (ref 3.5–5)
ALBUMIN/GLOB SERPL: 0.8 (ref 1.1–2.2)
ALP SERPL-CCNC: 80 U/L (ref 45–117)
ALT SERPL-CCNC: 45 U/L (ref 12–78)
ANION GAP SERPL CALC-SCNC: 4 MMOL/L (ref 5–15)
APPEARANCE UR: ABNORMAL
AST SERPL-CCNC: 15 U/L (ref 15–37)
BACTERIA URNS QL MICRO: ABNORMAL /HPF
BASOPHILS # BLD: 0 K/UL (ref 0–0.1)
BASOPHILS NFR BLD: 0 % (ref 0–1)
BILIRUB SERPL-MCNC: 1.1 MG/DL (ref 0.2–1)
BILIRUB UR QL: NEGATIVE
BUN SERPL-MCNC: 11 MG/DL (ref 6–20)
BUN/CREAT SERPL: 14 (ref 12–20)
CALCIUM SERPL-MCNC: 9.2 MG/DL (ref 8.5–10.1)
CHLORIDE SERPL-SCNC: 105 MMOL/L (ref 97–108)
CO2 SERPL-SCNC: 25 MMOL/L (ref 21–32)
COLOR UR: ABNORMAL
COMMENT:: NORMAL
CREAT SERPL-MCNC: 0.78 MG/DL (ref 0.55–1.02)
DIFFERENTIAL METHOD BLD: ABNORMAL
EOSINOPHIL # BLD: 0.1 K/UL (ref 0–0.4)
EOSINOPHIL NFR BLD: 1 % (ref 0–7)
EPITH CASTS URNS QL MICRO: ABNORMAL /LPF
ERYTHROCYTE [DISTWIDTH] IN BLOOD BY AUTOMATED COUNT: 14.8 % (ref 11.5–14.5)
GLOBULIN SER CALC-MCNC: 4.8 G/DL (ref 2–4)
GLUCOSE SERPL-MCNC: 106 MG/DL (ref 65–100)
GLUCOSE UR STRIP.AUTO-MCNC: NEGATIVE MG/DL
HCG UR QL: NEGATIVE
HCT VFR BLD AUTO: 38.1 % (ref 35–47)
HGB BLD-MCNC: 12.1 G/DL (ref 11.5–16)
HGB UR QL STRIP: ABNORMAL
IMM GRANULOCYTES # BLD AUTO: 0.1 K/UL (ref 0–0.04)
IMM GRANULOCYTES NFR BLD AUTO: 0 % (ref 0–0.5)
KETONES UR QL STRIP.AUTO: NEGATIVE MG/DL
LACTATE SERPL-SCNC: 0.9 MMOL/L (ref 0.4–2)
LEUKOCYTE ESTERASE UR QL STRIP.AUTO: ABNORMAL
LYMPHOCYTES # BLD: 1.7 K/UL (ref 0.8–3.5)
LYMPHOCYTES NFR BLD: 12 % (ref 12–49)
MCH RBC QN AUTO: 26 PG (ref 26–34)
MCHC RBC AUTO-ENTMCNC: 31.8 G/DL (ref 30–36.5)
MCV RBC AUTO: 81.9 FL (ref 80–99)
MONOCYTES # BLD: 1.1 K/UL (ref 0–1)
MONOCYTES NFR BLD: 8 % (ref 5–13)
NEUTS SEG # BLD: 10.9 K/UL (ref 1.8–8)
NEUTS SEG NFR BLD: 79 % (ref 32–75)
NITRITE UR QL STRIP.AUTO: NEGATIVE
NRBC # BLD: 0 K/UL (ref 0–0.01)
NRBC BLD-RTO: 0 PER 100 WBC
PH UR STRIP: 6 (ref 5–8)
PLATELET # BLD AUTO: 409 K/UL (ref 150–400)
PMV BLD AUTO: 10.6 FL (ref 8.9–12.9)
POTASSIUM SERPL-SCNC: 3.7 MMOL/L (ref 3.5–5.1)
PROT SERPL-MCNC: 8.5 G/DL (ref 6.4–8.2)
PROT UR STRIP-MCNC: 100 MG/DL
RBC # BLD AUTO: 4.65 M/UL (ref 3.8–5.2)
RBC #/AREA URNS HPF: ABNORMAL /HPF (ref 0–5)
SODIUM SERPL-SCNC: 134 MMOL/L (ref 136–145)
SP GR UR REFRACTOMETRY: 1.01 (ref 1–1.03)
SPECIMEN HOLD: NORMAL
SPECIMEN HOLD: NORMAL
UROBILINOGEN UR QL STRIP.AUTO: 0.2 EU/DL (ref 0.2–1)
WBC # BLD AUTO: 14 K/UL (ref 3.6–11)
WBC URNS QL MICRO: ABNORMAL /HPF (ref 0–4)

## 2023-10-14 PROCEDURE — 6360000002 HC RX W HCPCS

## 2023-10-14 PROCEDURE — 2580000003 HC RX 258

## 2023-10-14 PROCEDURE — 74177 CT ABD & PELVIS W/CONTRAST: CPT

## 2023-10-14 PROCEDURE — 80053 COMPREHEN METABOLIC PANEL: CPT

## 2023-10-14 PROCEDURE — 81025 URINE PREGNANCY TEST: CPT

## 2023-10-14 PROCEDURE — 87040 BLOOD CULTURE FOR BACTERIA: CPT

## 2023-10-14 PROCEDURE — 81001 URINALYSIS AUTO W/SCOPE: CPT

## 2023-10-14 PROCEDURE — 96375 TX/PRO/DX INJ NEW DRUG ADDON: CPT

## 2023-10-14 PROCEDURE — 96374 THER/PROPH/DIAG INJ IV PUSH: CPT

## 2023-10-14 PROCEDURE — 99285 EMERGENCY DEPT VISIT HI MDM: CPT

## 2023-10-14 PROCEDURE — 6360000004 HC RX CONTRAST MEDICATION

## 2023-10-14 PROCEDURE — 87077 CULTURE AEROBIC IDENTIFY: CPT

## 2023-10-14 PROCEDURE — 36415 COLL VENOUS BLD VENIPUNCTURE: CPT

## 2023-10-14 PROCEDURE — 83605 ASSAY OF LACTIC ACID: CPT

## 2023-10-14 PROCEDURE — 87186 SC STD MICRODIL/AGAR DIL: CPT

## 2023-10-14 PROCEDURE — 76830 TRANSVAGINAL US NON-OB: CPT

## 2023-10-14 PROCEDURE — 85025 COMPLETE CBC W/AUTO DIFF WBC: CPT

## 2023-10-14 PROCEDURE — 76856 US EXAM PELVIC COMPLETE: CPT

## 2023-10-14 PROCEDURE — 96361 HYDRATE IV INFUSION ADD-ON: CPT

## 2023-10-14 PROCEDURE — 87086 URINE CULTURE/COLONY COUNT: CPT

## 2023-10-14 RX ORDER — PHENAZOPYRIDINE HYDROCHLORIDE 100 MG/1
100 TABLET, FILM COATED ORAL 3 TIMES DAILY PRN
Qty: 9 TABLET | Refills: 0 | Status: SHIPPED | OUTPATIENT
Start: 2023-10-14 | End: 2023-10-17

## 2023-10-14 RX ORDER — 0.9 % SODIUM CHLORIDE 0.9 %
1000 INTRAVENOUS SOLUTION INTRAVENOUS ONCE
Status: COMPLETED | OUTPATIENT
Start: 2023-10-14 | End: 2023-10-14

## 2023-10-14 RX ORDER — KETOROLAC TROMETHAMINE 30 MG/ML
30 INJECTION, SOLUTION INTRAMUSCULAR; INTRAVENOUS
Status: COMPLETED | OUTPATIENT
Start: 2023-10-14 | End: 2023-10-14

## 2023-10-14 RX ORDER — CEFDINIR 300 MG/1
300 CAPSULE ORAL 2 TIMES DAILY
Qty: 28 CAPSULE | Refills: 0 | Status: SHIPPED | OUTPATIENT
Start: 2023-10-14 | End: 2023-10-28

## 2023-10-14 RX ADMIN — WATER 1000 MG: 1 INJECTION INTRAMUSCULAR; INTRAVENOUS; SUBCUTANEOUS at 15:25

## 2023-10-14 RX ADMIN — IOPAMIDOL 100 ML: 755 INJECTION, SOLUTION INTRAVENOUS at 14:58

## 2023-10-14 RX ADMIN — SODIUM CHLORIDE 1000 ML: 9 INJECTION, SOLUTION INTRAVENOUS at 12:20

## 2023-10-14 RX ADMIN — KETOROLAC TROMETHAMINE 30 MG: 30 INJECTION, SOLUTION INTRAMUSCULAR at 12:20

## 2023-10-14 ASSESSMENT — ENCOUNTER SYMPTOMS
BACK PAIN: 1
DIARRHEA: 0
FACIAL SWELLING: 0
ABDOMINAL PAIN: 1
VOMITING: 1
SORE THROAT: 0
NAUSEA: 1
RHINORRHEA: 0
COUGH: 0

## 2023-10-14 ASSESSMENT — PAIN DESCRIPTION - LOCATION: LOCATION: ABDOMEN

## 2023-10-14 ASSESSMENT — PAIN SCALES - GENERAL: PAINLEVEL_OUTOF10: 10

## 2023-10-14 NOTE — ED PROVIDER NOTES
[facility] EMERGENCY [facility]      Pt Name: Paty Walsh  MRN: [id number]  [address] [facility] 1985  Date of evaluation: 10/14/2023  Provider: NICOL Perez NP    [address]       Chief Complaint   Patient presents with    Abdominal Pain         HISTORY OF PRESENT ILLNESS   (Location/Symptom, Timing/Onset, Context/Setting, Quality, Duration, Modifying Factors, Severity)  Note limiting factors. Paty Walsh is a 45 y.o. female presenting to the emergency department c/o lower abdominal pain, back pain, dysuria, and fever for the past 2 days. Unknown pregnancy. + nausea, vomiting. Pt reports taking acetaminophen around 1 AM today. Denies hematuria, diarrhea, sore throat, rhinorrhea. Medical hx: denies  Surgical hx: cholecystectomy      The history is provided by the patient. No  was used. Review of External Medical Records:     Nursing Notes were reviewed. REVIEW OF SYSTEMS    (2-9 systems for level 4, 10 or more for level 5)     Review of Systems   Constitutional:  Positive for chills and fever. Negative for activity change and appetite change. HENT:  Negative for facial swelling, rhinorrhea and sore throat. Respiratory:  Negative for cough. Gastrointestinal:  Positive for abdominal pain, nausea and vomiting. Negative for diarrhea. Genitourinary:  Positive for difficulty urinating, dysuria and flank pain. Negative for hematuria. Musculoskeletal:  Positive for back pain. Skin:  Negative for rash. All other systems reviewed and are negative. Except as noted above the remainder of the review of systems was reviewed and negative. PAST MEDICAL HISTORY   No past medical history on file.       SURGICAL HISTORY       Past Surgical History:   Procedure Laterality Date    CHOLECYSTECTOMY, LAPAROSCOPIC      GYN           CURRENT MEDICATIONS       Previous Medications    No medications on file       ALLERGIES present. Mental Status: She is alert and oriented to person, place, and time. Mental status is at baseline. Sensory: No sensory deficit. Gait: Gait normal.   Psychiatric:         Mood and Affect: Mood normal.         DIAGNOSTIC RESULTS     EKG: All EKG's are interpreted by the Emergency Department Physician who either signs or Co-signs this chart in the absence of a cardiologist.    RADIOLOGY:   Non-plain film images such as CT, Ultrasound and MRI are read by the radiologist. Plain radiographic images are visualized and preliminarily interpreted by the emergency physician with the below findings:    Interpretation per the Radiologist below, if available at the time of this note:    CT ABDOMEN PELVIS W IV CONTRAST Additional Contrast? None   Final Result   1. Acute pyelonephritis on the right. 2. Concern for possible cystitis. 3. Correlate with urinary history and laboratory parameters. 4. Other incidental and postoperative changes. US PELVIS COMPLETE   Final Result   1. Transabdominal pelvic ultrasound revealing unremarkable uterus. Ovaries not   seen. .   2. Transvaginal pelvic ultrasound revealing unremarkable uterus. Ovaries not   seen. .          US NON OB TRANSVAGINAL   Final Result   1. Transabdominal pelvic ultrasound revealing unremarkable uterus. Ovaries not   seen. .   2. Transvaginal pelvic ultrasound revealing unremarkable uterus. Ovaries not   seen. Ernesto Oliver                LABS:  Labs Reviewed   CBC WITH AUTO DIFFERENTIAL - Abnormal; Notable for the following components:       Result Value    WBC 14.0 (*)     RDW 14.8 (*)     Platelets 935 (*)     Neutrophils % 79 (*)     Neutrophils Absolute 10.9 (*)     Monocytes Absolute 1.1 (*)     Absolute Immature Granulocyte 0.1 (*)     All other components within normal limits   COMPREHENSIVE METABOLIC PANEL - Abnormal; Notable for the following components:    Sodium 134 (*)     Anion Gap 4 (*)     Glucose 106 (*)     Total Bilirubin 1.1 (*)

## 2023-10-14 NOTE — DISCHARGE INSTRUCTIONS
Take ibuprofen and tylenol as needed for fever and pain. Take ibuprofen with food. Take pyridium to help with your bladder discomfort. This medication will make your urine orange. Take the antibiotic as instructed and complete full course. Follow-up with the provided clinic in 2-3 days for reevaluation. Call for appointment as soon as possible.

## 2023-10-15 LAB
BACTERIA SPEC CULT: ABNORMAL
BACTERIA SPEC CULT: NORMAL
BACTERIA SPEC CULT: NORMAL
CC UR VC: ABNORMAL
SERVICE CMNT-IMP: ABNORMAL
SERVICE CMNT-IMP: NORMAL
SERVICE CMNT-IMP: NORMAL

## 2023-10-16 LAB
BACTERIA SPEC CULT: ABNORMAL
CC UR VC: ABNORMAL
SERVICE CMNT-IMP: ABNORMAL

## 2023-10-19 LAB
BACTERIA SPEC CULT: NORMAL
BACTERIA SPEC CULT: NORMAL
SERVICE CMNT-IMP: NORMAL
SERVICE CMNT-IMP: NORMAL